# Patient Record
Sex: FEMALE | Race: WHITE | NOT HISPANIC OR LATINO | URBAN - METROPOLITAN AREA
[De-identification: names, ages, dates, MRNs, and addresses within clinical notes are randomized per-mention and may not be internally consistent; named-entity substitution may affect disease eponyms.]

---

## 2022-06-04 ENCOUNTER — INPATIENT (INPATIENT)
Facility: HOSPITAL | Age: 52
LOS: 3 days | Discharge: HOME | DRG: 884 | End: 2022-06-08
Attending: HOSPITALIST | Admitting: STUDENT IN AN ORGANIZED HEALTH CARE EDUCATION/TRAINING PROGRAM
Payer: COMMERCIAL

## 2022-06-04 VITALS
SYSTOLIC BLOOD PRESSURE: 127 MMHG | RESPIRATION RATE: 18 BRPM | WEIGHT: 205.03 LBS | OXYGEN SATURATION: 98 % | HEART RATE: 81 BPM | HEIGHT: 68 IN | TEMPERATURE: 99 F | DIASTOLIC BLOOD PRESSURE: 71 MMHG

## 2022-06-04 DIAGNOSIS — T50.902A POISONING BY UNSPECIFIED DRUGS, MEDICAMENTS AND BIOLOGICAL SUBSTANCES, INTENTIONAL SELF-HARM, INITIAL ENCOUNTER: ICD-10-CM

## 2022-06-04 DIAGNOSIS — I21.4 NON-ST ELEVATION (NSTEMI) MYOCARDIAL INFARCTION: ICD-10-CM

## 2022-06-04 DIAGNOSIS — R77.8 OTHER SPECIFIED ABNORMALITIES OF PLASMA PROTEINS: ICD-10-CM

## 2022-06-04 DIAGNOSIS — R79.89 OTHER SPECIFIED ABNORMAL FINDINGS OF BLOOD CHEMISTRY: ICD-10-CM

## 2022-06-04 DIAGNOSIS — N17.9 ACUTE KIDNEY FAILURE, UNSPECIFIED: ICD-10-CM

## 2022-06-04 LAB
ALBUMIN SERPL ELPH-MCNC: 4.6 G/DL — SIGNIFICANT CHANGE UP (ref 3.3–5.2)
ALP SERPL-CCNC: 242 U/L — HIGH (ref 40–120)
ALT FLD-CCNC: 898 U/L — HIGH
ANION GAP SERPL CALC-SCNC: 14 MMOL/L — SIGNIFICANT CHANGE UP (ref 5–17)
ANION GAP SERPL CALC-SCNC: 18 MMOL/L — HIGH (ref 5–17)
APPEARANCE UR: CLEAR — SIGNIFICANT CHANGE UP
AST SERPL-CCNC: 682 U/L — HIGH
BACTERIA # UR AUTO: ABNORMAL
BASOPHILS # BLD AUTO: 0.01 K/UL — SIGNIFICANT CHANGE UP (ref 0–0.2)
BASOPHILS NFR BLD AUTO: 0.1 % — SIGNIFICANT CHANGE UP (ref 0–2)
BILIRUB SERPL-MCNC: 0.9 MG/DL — SIGNIFICANT CHANGE UP (ref 0.4–2)
BILIRUB UR-MCNC: NEGATIVE — SIGNIFICANT CHANGE UP
BUN SERPL-MCNC: 27.5 MG/DL — HIGH (ref 8–20)
BUN SERPL-MCNC: 31 MG/DL — HIGH (ref 8–20)
CALCIUM SERPL-MCNC: 8.4 MG/DL — LOW (ref 8.6–10.2)
CALCIUM SERPL-MCNC: 9.1 MG/DL — SIGNIFICANT CHANGE UP (ref 8.6–10.2)
CHLORIDE SERPL-SCNC: 89 MMOL/L — LOW (ref 98–107)
CHLORIDE SERPL-SCNC: 91 MMOL/L — LOW (ref 98–107)
CK MB CFR SERPL CALC: 8.4 NG/ML — HIGH (ref 0–6.7)
CK SERPL-CCNC: 375 U/L — HIGH (ref 25–170)
CO2 SERPL-SCNC: 22 MMOL/L — SIGNIFICANT CHANGE UP (ref 22–29)
CO2 SERPL-SCNC: 25 MMOL/L — SIGNIFICANT CHANGE UP (ref 22–29)
COLOR SPEC: YELLOW — SIGNIFICANT CHANGE UP
CREAT SERPL-MCNC: 1.64 MG/DL — HIGH (ref 0.5–1.3)
CREAT SERPL-MCNC: 2.18 MG/DL — HIGH (ref 0.5–1.3)
CRP SERPL-MCNC: 62 MG/L — HIGH
D DIMER BLD IA.RAPID-MCNC: 352 NG/ML DDU — HIGH
DIFF PNL FLD: ABNORMAL
EGFR: 26 ML/MIN/1.73M2 — LOW
EGFR: 37 ML/MIN/1.73M2 — LOW
EOSINOPHIL # BLD AUTO: 0 K/UL — SIGNIFICANT CHANGE UP (ref 0–0.5)
EOSINOPHIL NFR BLD AUTO: 0 % — SIGNIFICANT CHANGE UP (ref 0–6)
EPI CELLS # UR: SIGNIFICANT CHANGE UP
ERYTHROCYTE [SEDIMENTATION RATE] IN BLOOD: 18 MM/HR — SIGNIFICANT CHANGE UP (ref 0–20)
GLUCOSE SERPL-MCNC: 136 MG/DL — HIGH (ref 70–99)
GLUCOSE SERPL-MCNC: 140 MG/DL — HIGH (ref 70–99)
GLUCOSE UR QL: NEGATIVE MG/DL — SIGNIFICANT CHANGE UP
HCT VFR BLD CALC: 35.5 % — SIGNIFICANT CHANGE UP (ref 34.5–45)
HGB BLD-MCNC: 10.8 G/DL — LOW (ref 11.5–15.5)
IMM GRANULOCYTES NFR BLD AUTO: 0.7 % — SIGNIFICANT CHANGE UP (ref 0–1.5)
INR BLD: 1.1 RATIO — SIGNIFICANT CHANGE UP (ref 0.88–1.16)
KETONES UR-MCNC: ABNORMAL
LEUKOCYTE ESTERASE UR-ACNC: NEGATIVE — SIGNIFICANT CHANGE UP
LYMPHOCYTES # BLD AUTO: 0.37 K/UL — LOW (ref 1–3.3)
LYMPHOCYTES # BLD AUTO: 5.2 % — LOW (ref 13–44)
MCHC RBC-ENTMCNC: 24.8 PG — LOW (ref 27–34)
MCHC RBC-ENTMCNC: 30.4 GM/DL — LOW (ref 32–36)
MCV RBC AUTO: 81.4 FL — SIGNIFICANT CHANGE UP (ref 80–100)
MONOCYTES # BLD AUTO: 0.63 K/UL — SIGNIFICANT CHANGE UP (ref 0–0.9)
MONOCYTES NFR BLD AUTO: 8.9 % — SIGNIFICANT CHANGE UP (ref 2–14)
NEUTROPHILS # BLD AUTO: 5.99 K/UL — SIGNIFICANT CHANGE UP (ref 1.8–7.4)
NEUTROPHILS NFR BLD AUTO: 85.1 % — HIGH (ref 43–77)
NITRITE UR-MCNC: NEGATIVE — SIGNIFICANT CHANGE UP
NT-PROBNP SERPL-SCNC: 2396 PG/ML — HIGH (ref 0–300)
PH UR: 5 — SIGNIFICANT CHANGE UP (ref 5–8)
PLATELET # BLD AUTO: 230 K/UL — SIGNIFICANT CHANGE UP (ref 150–400)
POTASSIUM SERPL-MCNC: 4 MMOL/L — SIGNIFICANT CHANGE UP (ref 3.5–5.3)
POTASSIUM SERPL-MCNC: 6.2 MMOL/L — CRITICAL HIGH (ref 3.5–5.3)
POTASSIUM SERPL-SCNC: 4 MMOL/L — SIGNIFICANT CHANGE UP (ref 3.5–5.3)
POTASSIUM SERPL-SCNC: 6.2 MMOL/L — CRITICAL HIGH (ref 3.5–5.3)
PROT SERPL-MCNC: 7.5 G/DL — SIGNIFICANT CHANGE UP (ref 6.6–8.7)
PROT UR-MCNC: NEGATIVE — SIGNIFICANT CHANGE UP
PROTHROM AB SERPL-ACNC: 12.8 SEC — SIGNIFICANT CHANGE UP (ref 10.5–13.4)
RBC # BLD: 4.36 M/UL — SIGNIFICANT CHANGE UP (ref 3.8–5.2)
RBC # FLD: 17 % — HIGH (ref 10.3–14.5)
RBC CASTS # UR COMP ASSIST: SIGNIFICANT CHANGE UP /HPF (ref 0–4)
SARS-COV-2 RNA SPEC QL NAA+PROBE: SIGNIFICANT CHANGE UP
SODIUM SERPL-SCNC: 129 MMOL/L — LOW (ref 135–145)
SODIUM SERPL-SCNC: 130 MMOL/L — LOW (ref 135–145)
SP GR SPEC: 1.02 — SIGNIFICANT CHANGE UP (ref 1.01–1.02)
TROPONIN T SERPL-MCNC: 0.09 NG/ML — HIGH (ref 0–0.06)
TROPONIN T SERPL-MCNC: 0.13 NG/ML — HIGH (ref 0–0.06)
TROPONIN T SERPL-MCNC: 0.19 NG/ML — HIGH (ref 0–0.06)
TSH SERPL-MCNC: 0.41 UIU/ML — SIGNIFICANT CHANGE UP (ref 0.27–4.2)
UROBILINOGEN FLD QL: 1 MG/DL
WBC # BLD: 7.05 K/UL — SIGNIFICANT CHANGE UP (ref 3.8–10.5)
WBC # FLD AUTO: 7.05 K/UL — SIGNIFICANT CHANGE UP (ref 3.8–10.5)
WBC UR QL: SIGNIFICANT CHANGE UP /HPF (ref 0–5)

## 2022-06-04 PROCEDURE — 99223 1ST HOSP IP/OBS HIGH 75: CPT

## 2022-06-04 PROCEDURE — 71045 X-RAY EXAM CHEST 1 VIEW: CPT | Mod: 26

## 2022-06-04 PROCEDURE — 99255 IP/OBS CONSLTJ NEW/EST HI 80: CPT

## 2022-06-04 PROCEDURE — 93010 ELECTROCARDIOGRAM REPORT: CPT

## 2022-06-04 PROCEDURE — 99291 CRITICAL CARE FIRST HOUR: CPT

## 2022-06-04 RX ORDER — DEXTROSE 10 % IN WATER 10 %
250 INTRAVENOUS SOLUTION INTRAVENOUS ONCE
Refills: 0 | Status: COMPLETED | OUTPATIENT
Start: 2022-06-04 | End: 2022-06-04

## 2022-06-04 RX ORDER — VENLAFAXINE HCL 75 MG
150 CAPSULE, EXT RELEASE 24 HR ORAL DAILY
Refills: 0 | Status: DISCONTINUED | OUTPATIENT
Start: 2022-06-04 | End: 2022-06-04

## 2022-06-04 RX ORDER — GABAPENTIN 400 MG/1
400 CAPSULE ORAL
Refills: 0 | Status: DISCONTINUED | OUTPATIENT
Start: 2022-06-04 | End: 2022-06-08

## 2022-06-04 RX ORDER — FUROSEMIDE 40 MG
20 TABLET ORAL ONCE
Refills: 0 | Status: DISCONTINUED | OUTPATIENT
Start: 2022-06-04 | End: 2022-06-04

## 2022-06-04 RX ORDER — DEXTROSE 50 % IN WATER 50 %
50 SYRINGE (ML) INTRAVENOUS ONCE
Refills: 0 | Status: DISCONTINUED | OUTPATIENT
Start: 2022-06-04 | End: 2022-06-04

## 2022-06-04 RX ORDER — HEPARIN SODIUM 5000 [USP'U]/ML
5000 INJECTION INTRAVENOUS; SUBCUTANEOUS EVERY 12 HOURS
Refills: 0 | Status: DISCONTINUED | OUTPATIENT
Start: 2022-06-04 | End: 2022-06-08

## 2022-06-04 RX ORDER — SODIUM CHLORIDE 9 MG/ML
1000 INJECTION INTRAMUSCULAR; INTRAVENOUS; SUBCUTANEOUS ONCE
Refills: 0 | Status: COMPLETED | OUTPATIENT
Start: 2022-06-04 | End: 2022-06-04

## 2022-06-04 RX ORDER — BUPRENORPHINE AND NALOXONE 2; .5 MG/1; MG/1
1 TABLET SUBLINGUAL
Qty: 0 | Refills: 0 | DISCHARGE

## 2022-06-04 RX ORDER — LORATADINE 10 MG/1
10 TABLET ORAL DAILY
Refills: 0 | Status: DISCONTINUED | OUTPATIENT
Start: 2022-06-04 | End: 2022-06-08

## 2022-06-04 RX ORDER — FOLIC ACID 0.8 MG
1 TABLET ORAL
Qty: 0 | Refills: 0 | DISCHARGE

## 2022-06-04 RX ORDER — CALCIUM GLUCONATE 100 MG/ML
1 VIAL (ML) INTRAVENOUS
Refills: 0 | Status: COMPLETED | OUTPATIENT
Start: 2022-06-04 | End: 2022-06-04

## 2022-06-04 RX ORDER — SODIUM CHLORIDE 9 MG/ML
1000 INJECTION INTRAMUSCULAR; INTRAVENOUS; SUBCUTANEOUS
Refills: 0 | Status: DISCONTINUED | OUTPATIENT
Start: 2022-06-04 | End: 2022-06-07

## 2022-06-04 RX ORDER — AMLODIPINE BESYLATE 2.5 MG/1
1 TABLET ORAL
Qty: 0 | Refills: 0 | DISCHARGE

## 2022-06-04 RX ORDER — INSULIN HUMAN 100 [IU]/ML
6 INJECTION, SOLUTION SUBCUTANEOUS ONCE
Refills: 0 | Status: COMPLETED | OUTPATIENT
Start: 2022-06-04 | End: 2022-06-04

## 2022-06-04 RX ORDER — CYCLOBENZAPRINE HYDROCHLORIDE 10 MG/1
5 TABLET, FILM COATED ORAL EVERY 12 HOURS
Refills: 0 | Status: DISCONTINUED | OUTPATIENT
Start: 2022-06-04 | End: 2022-06-08

## 2022-06-04 RX ORDER — INSULIN HUMAN 100 [IU]/ML
10 INJECTION, SOLUTION SUBCUTANEOUS ONCE
Refills: 0 | Status: DISCONTINUED | OUTPATIENT
Start: 2022-06-04 | End: 2022-06-04

## 2022-06-04 RX ORDER — SODIUM ZIRCONIUM CYCLOSILICATE 10 G/10G
10 POWDER, FOR SUSPENSION ORAL ONCE
Refills: 0 | Status: COMPLETED | OUTPATIENT
Start: 2022-06-04 | End: 2022-06-04

## 2022-06-04 RX ADMIN — GABAPENTIN 400 MILLIGRAM(S): 400 CAPSULE ORAL at 17:12

## 2022-06-04 RX ADMIN — Medication 100 GRAM(S): at 18:59

## 2022-06-04 RX ADMIN — INSULIN HUMAN 6 UNIT(S): 100 INJECTION, SOLUTION SUBCUTANEOUS at 13:18

## 2022-06-04 RX ADMIN — Medication 1000 MILLILITER(S): at 12:45

## 2022-06-04 RX ADMIN — SODIUM ZIRCONIUM CYCLOSILICATE 10 GRAM(S): 10 POWDER, FOR SUSPENSION ORAL at 13:19

## 2022-06-04 RX ADMIN — HEPARIN SODIUM 5000 UNIT(S): 5000 INJECTION INTRAVENOUS; SUBCUTANEOUS at 17:13

## 2022-06-04 RX ADMIN — SODIUM CHLORIDE 1000 MILLILITER(S): 9 INJECTION INTRAMUSCULAR; INTRAVENOUS; SUBCUTANEOUS at 12:45

## 2022-06-04 RX ADMIN — Medication 100 GRAM(S): at 17:20

## 2022-06-04 NOTE — ED ADULT NURSE NOTE - CHIEF COMPLAINT QUOTE
Pt BIBA from [facility], as per staff, pt found unresponsive in bed this morning, cyanotic and hypoxic, given 1mg narcan by staff, pt started on suboxone yesterday, denies additional drug use, pt states "I just feel really sleepy", AOx3 upon ED arrival, [facility] aide at bedside

## 2022-06-04 NOTE — CONSULT NOTE ADULT - SUBJECTIVE AND OBJECTIVE BOX
Patient is a 52y old  Female who presents with a chief complaint of     HPI:    · Chief Complaint: The patient is a 52y Female complaining of unresponsive.    · HPI Objective Statement: Pt is a 53 yo F BIB EMS from [facility] for unresponsive episode.  Pt is at [facility] after a suicide attempt in which she drank a bottle of vodka and took muscle relaxants in an attempt to kill herself. Pt went to [facility] and was sent to [facility] from there.  Pt was started on Suboxone yesterday and today they found her unresponsive in her bed. Pt was hypoxic and EMS was called and patient was administered 1 mg of Narcan and woke up.  Pt with no complaints currently.      PAST MEDICAL & SURGICAL HISTORY: Hypertension,     FAMILY HISTORY: No ESRD,    Social History: Alcoholism,     MEDICATIONS  (STANDING):    calcium gluconate IVPB 1 Gram(s) IV Intermittent every 1 hour  sodium chloride 0.9%. 1000 milliLiter(s) (100 mL/Hr) IV Continuous <Continuous>    MEDICATIONS  (PRN):    Allergies    No Known Allergies    Intolerances    REVIEW OF SYSTEMS:    CONSTITUTIONAL: No fever, weight loss, + fatigue  EYES: No eye pain, visual disturbances, or discharge  ENMT:  No difficulty hearing, tinnitus, vertigo; No sinus or throat pain  NECK: No pain or stiffness  BREASTS: No pain, masses, or nipple discharge  RESPIRATORY: No cough, wheezing, chills or hemoptysis; No shortness of breath  CARDIOVASCULAR: No chest pain, palpitations, dizziness, or leg swelling  GASTROINTESTINAL: No abdominal or epigastric pain. No nausea, vomiting, or hematemesis; No diarrhea or constipation. No melena or hematochezia.  GENITOURINARY: No dysuria, frequency, hematuria, or incontinence  NEUROLOGICAL: No headaches, memory loss, loss of strength, numbness, + tremors  SKIN: No itching, burning, rashes, or lesions   LYMPH NODES: No enlarged glands  ENDOCRINE: No heat or cold intolerance; No hair loss  MUSCULOSKELETAL: No joint pain or swelling; ++ muscle, back,  No extremity pain  PSYCHIATRIC: +++ depression, anxiety, mood swings,  difficulty sleeping  HEME/LYMPH: No easy bruising, or bleeding gums  ALLERGY AND IMMUNOLOGIC: No hives or eczema    Vital Signs Last 24 Hrs  T(C): 37.1 (04 Jun 2022 11:08), Max: 37.1 (04 Jun 2022 11:08)  T(F): 98.7 (04 Jun 2022 11:08), Max: 98.7 (04 Jun 2022 11:08)  HR: 81 (04 Jun 2022 11:08) (81 - 81)  BP: 127/71 (04 Jun 2022 11:08) (127/71 - 127/71)  RR: 18 (04 Jun 2022 11:08) (18 - 18)  SpO2: 98% (04 Jun 2022 11:08) (98% - 98%)    PHYSICAL EXAM:    GENERAL: NAD, well-groomed, well-developed , Alert, {Pale }  HEAD:  Atraumatic, Normocephalic  EYES: EOMI, PERRLA, conjunctiva and sclera clear  ENMT: Dry mucous membranes, No lesions  NECK: Supple, No JVD,   NERVOUS SYSTEM:  Alert & Oriented X3, Good concentration;   CHEST/LUNG: Clear to percussion bilaterally; No rales, rhonchi, wheezing, or rubs  HEART: Regular rate and rhythm; No murmurs, rubs, or gallops  ABDOMEN: Soft, Nontender, Nondistended; Bowel sounds present  EXTREMITIES:  2+ Peripheral Pulses, No clubbing, cyanosis, or edema  LYMPH: No lymphadenopathy noted  SKIN: No rashes or lesions    LABS:                        10.8   7.05  )-----------( 230      ( 04 Jun 2022 11:38 )             35.5     06-04    129<L>  |  89<L>  |  27.5<H>  ----------------------------<  136<H>  6.2<HH>   |  22.0  |  2.18<H>    Ca    8.4<L>      04 Jun 2022 11:38    TPro  7.5  /  Alb  4.6  /  TBili  0.9  /  DBili  x   /  AST  682<H>  /  ALT  898<H>  /  AlkPhos  242<H>  06-04    RADIOLOGY & ADDITIONAL TESTS:  eGFR: 26: The estimated glomerular filtration rate (eGFR) is calculated using the   2021 CKD-EPI creatinine equation, which does not have a coefficient for   race and is validated in individuals 18 years of age and older (N Engl J   Med 2021; 385:0458-2704). Creatinine-based eGFR may be inaccurate in   various situations including but not limited to extremes of muscle mass,   altered dietary protein intake, or medications that affect renal tubular   creatinine secretion. mL/min/1.73m2 (06.04.22 @ 11:38)   
                                             [facility]                                              CARDIOLOGY AT [place] [address]                                             Telephone: [phone]. Fax:[phone]                                                         CARDIOLOGY CONSULTATION NOTE                                                                                             Consult requested by:  ewelina Kilpatrick  History obtained by: Patient and medical record  Community Cardiologist: 3 [facility]   obtained: Yes [  ] No [ x ]  Reason for Consultation: Elevated trop  Available out pt records reviewed: Yes [  ] No [ x ]    Chief complaint:    Patient is a 52y old  Female  with PMH of alcoholism, depression, HTN, Chronic back pain who presents to the ER from [facility] after being found hypoxic and non responsive.  She was doing well in [facility] and states she did not take anything other then meds prescribed.  Was started on buprenorphine/naloxone yesterday which made her sleepy.  She was in [facility] after a suicide attempt.  She states she no longer wants to die  Arrived in er and blood work c/w ARF. hyponatrmeia with transaminitis and with elevated top  We were asked to see her for positive trop  on exam she has a positive rub    PMH  HTN    PSH  Gastric bypass surgery  Lumbar surgery  Choleycystectomy    SOCIAL HISTORY:    Former smoker  Etoh at least one liter vodka per day  Uses marijuana    Family History of Cardiovascular Disease:  Yes [ x ] No [  ]  Coronary Artery Disease in first degree relative: Yes [  ] No [ x ]  Sudden Cardiac Death in First degree relative: Yes [  ] No x[  ]    HOME MEDICATIONS: Cardiac  Norvasc 10mg qd  Hctz 25 mg qd      CURRENT MEDICATIONS:  loratadine  gabapentin  calcium gluconate IVPB  heparin   Injectable  sodium chloride 0.9%.    ALLERGIES: NKDA    REVIEW OF SYMPTOMS:   CONSTITUTIONAL: No fever, no chills, no weight loss, no weight gain, no fatigue   ENMT:  No vertigo; No sinus or throat pain  NECK: No pain or stiffness  CARDIOVASCULAR: No chest pain, no dyspnea, no syncope/presyncope, no palpitations, no dizziness, no Orthopnea, no Paroxsymal nocturnal dyspnea  RESPIRATORY: no Shortness of breath, no cough, no wheezing  : No dysuria, no hematuria   GI: No dark color stool, no nausea, no diarrhea, no constipation, no abdominal pain   NEURO: No headache, no slurred speech   MUSCULOSKELETAL: No joint pain or swelling; No muscle, back, or extremity pain  PSYCH: No agitation, no anxiety.    ALL OTHER REVIEW OF SYSTEMS ARE NEGATIVE.      Vital Signs Last 24 Hrs  T(C): 37.1 (04 Jun 2022 11:08), Max: 37.1 (04 Jun 2022 11:08)  T(F): 98.7 (04 Jun 2022 11:08), Max: 98.7 (04 Jun 2022 11:08)  HR: 81 (04 Jun 2022 11:08) (81 - 81)  BP: 127/71 (04 Jun 2022 11:08) (127/71 - 127/71)  BP(mean): --  RR: 18 (04 Jun 2022 11:08) (18 - 18)  SpO2: 98% (04 Jun 2022 11:08) (98% - 98%)  INTAKE AND OUTPUT:     PHYSICAL EXAM:  Constitutional: Comfortable . No acute distress.   HEENT: Atraumatic and normocephalic , neck is supple . no JVD. No carotid bruit.  CNS: A&Ox3. No focal deficits.   Respiratory: CTAB, unlabored   Cardiovascular:s1&s2 regular 2/6 danae lsb ++ rub  Gastrointestinal: Soft, non-tender. +Bowel sounds.   MSK: full ROM extremities x 4  Extremities: No edema. No cyanosis   Psychiatric: Calm . no agitation.   Skin: Warm and dry, no ulcers on extremities       LABS:                        10.8   7.05  )-----------( 230      ( 04 Jun 2022 11:38 )             35.5     06-04    129<L>  |  89<L>  |  27.5<H>  ----------------------------<  136<H>  6.2<HH>   |  22.0  |  2.18<H>    Ca    8.4<L>      04 Jun 2022 11:38    TPro  7.5  /  Alb  4.6  /  TBili  0.9  /  DBili  x   /  AST  682<H>  /  ALT  898<H>  /  AlkPhos  242<H>  06-04    CARDIAC MARKERS ( 04 Jun 2022 11:38 )  x     / 0.19 ng/mL / x     / x     / x        ;p-BNP=    INTERPRETATION OF TELEMETRY:     ECG: NSR wnl  Prior ECG: Yes [  ] No [ x ]

## 2022-06-04 NOTE — CONSULT NOTE ADULT - ASSESSMENT
PETER -Pre Renal,  ? Baseline     Hyperkalemia,     Hypovolemia,     labs and imaging reviewed.  EKG NSR with no ST-T changes.  D-dimer minimally elevated unable to do CTA as patient has poor kidney function currently also unlikely PE.  trop elevated as well as hyperkalemia in setting of PETER.    Patient treated for hyperkalemia with insulin, dextrose, IVF, lokelma.      Case d/w Dr. Moises Steinberg for admission.    Labs, EKG, Imaging Studies, Medications Reviewed,      Rec :     Avoidance of nephrotoxins/nephrotoxin medication adjustment  Medication dosage adjustment for kidney function  Continuous IVF administration ,  Close monitoring of serum Creatinine and UO  Acid-base, electrolyte and albumin status management  Consider alternatives to radiocontrast administration  Optimizing hemodynamics:    W.U ,  D/W Dr. Steinberg, 
52y old  Female  with PMH of alcoholism, depression, HTN, Chronic back pain who presents to the ER from [facility] after being found hypoxic and non responsive.  She was doing well in [facility] and states she did not take anything other then meds prescribed.  Was started on buprenorphine/naloxone yesterday which made her sleepy.  She was in [facility] after a suicide attempt.  She states she no longer wants to die  Arrived in er and blood work c/w ARF. hyponatremia with transaminitis and with elevated top  We were asked to see her for positive trop  on exam she has a positive rub

## 2022-06-04 NOTE — CONSULT NOTE ADULT - PROBLEM SELECTOR RECOMMENDATION 9
in the setting of hypoxemia and renal failure  would trend trop  Likely does not represent cardiac ischemic but would repeat ekg in am and trend trop  + cardiac rub on exam would get crp esr and Echo   Further workup dependent on above  will consider ischemic work up this admit

## 2022-06-04 NOTE — CONSULT NOTE ADULT - PROBLEM SELECTOR RECOMMENDATION 3
Patient notified   unclear what patient took  She denies this as well as any suicide idealization  Behavioral health consult

## 2022-06-04 NOTE — ED PROVIDER NOTE - CLINICAL SUMMARY MEDICAL DECISION MAKING FREE TEXT BOX
labs and imaging reviewed.  EKG NSR with no ST-T changes.  dimer minimally elevated unable to do CTA as patient has poor kidney function currently also unlikely PE.  trop elevated as well as hyperkalemia in setting of PETER.  renal and cards consulted.  Pt treated for hyperkalemia with insulin, dextrose, IVF, lokelma.  Case d/w Dr. Moises Steinberg for admission.

## 2022-06-04 NOTE — H&P ADULT - HISTORY OF PRESENT ILLNESS
52F presented from [facility] after being found to be unresponsive. 52F presented from [facility] after being found to be unresponsive and hypoxic. Naloxone was administered and the patient was transferred to the hospital for evaluation. The patient was unable to remember the events. She noted that she was in her usual state of health the day prior and was started on buprenorphine/naloxone yesterday. She did state that the medication had made her feel somewhat sleepy but was able to carry on throughout the day. She denied episodes of dyspnea, chest pain, or palpitations. She reported that she had been admitted to [facility] after an attempted suicide by overdose with alcohol and muscle relaxers. Upon arrival to the emergency department, the patient was more awake and alert and without dyspnea. Initial laboratory studies were notable for acute kidney injury with hyperkalemia as well as transaminitis and elevated troponin level.

## 2022-06-04 NOTE — ED ADULT NURSE NOTE - OBJECTIVE STATEMENT
patient came in for unresponsiveness from long term facility, was given 1 mg narcan and patient woke up per ems. patient still a little drowsy but oriented and awake.

## 2022-06-04 NOTE — ED ADULT TRIAGE NOTE - CHIEF COMPLAINT QUOTE
Pt BIBA from [facility], as per staff, pt found unresponsive in bed this morning, cyanotic and hypoxic, given 1mg narcan by staff, pt started on suboxone yesterday, denies additional drug use, pt states "I just feel really sleepy", AOx3 upon ED arrival, [facility] aide at bedside

## 2022-06-04 NOTE — H&P ADULT - NSHPPHYSICALEXAM_GEN_ALL_CORE
Vital Signs Last 24 Hrs  T(C): 37.1 (04 Jun 2022 11:08), Max: 37.1 (04 Jun 2022 11:08)  T(F): 98.7 (04 Jun 2022 11:08), Max: 98.7 (04 Jun 2022 11:08)  HR: 81 (04 Jun 2022 11:08) (81 - 81)  BP: 127/71 (04 Jun 2022 11:08) (127/71 - 127/71)  BP(mean): --  RR: 18 (04 Jun 2022 11:08) (18 - 18)  SpO2: 98% (04 Jun 2022 11:08) (98% - 98%)    General appearance: No acute distress, Awake, Alert  HEENT: Normocephalic, Atraumatic, Conjunctiva clear, EOMI  Neck: Supple, No JVD, No tenderness  Lungs: Breath sound equal bilaterally, No wheezes, No rales  Cardiovascular: S1S2, Regular rhythm  Abdomen: Soft, Nontender, Nondistended, No guarding/rebound, Positive bowel sounds  Extremities: No clubbing, No cyanosis, No edema, No calf tenderness  Neuro: Strength equal bilaterally, No tremors  Psychiatric: Appropriate mood, Normal affect

## 2022-06-04 NOTE — ED PROVIDER NOTE - OBJECTIVE STATEMENT
Pt is a 53 yo F BIBEMS from [facility] for unresponsive episode.  Pt is at [facility] after a suicide attempt in which she drank a bottle of vodka and took muscle relaxants in an attempt to kill herself. Pt went to [facility] and was sent to [facility] from there.  Pt was started on suboxone yesterday and today they found her unresponsive in her bed. Pt was hypoxic and EMS was called and patient was administered 1 mg of narcan and woke up.  Pt with no complaints currently.

## 2022-06-04 NOTE — ED PROVIDER NOTE - PHYSICAL EXAMINATION
Constitutional - well-developed.   Head - NCAT. Airway patent.   Eyes - PERRL.   CV - RRR. no murmur. no edema.   Pulm - CTAB.   Abd - soft, nt. no rebound. no guarding.   Neuro - A&Ox3. strength 5/5 x4. sensation intact x4. normal gait.   Skin - No rash. .  MSK - normal ROM.

## 2022-06-04 NOTE — H&P ADULT - ASSESSMENT
52F who was previously admitted to [facility] after a suicide attempt who was recently started on buprenorphine/naloxone who was found to be unresponsive and hypoxic for which naloxone was administered. Laboratory studies later noted acute kidney injury with hyperkalemia, elevated troponin level, and transaminitis.    Unresponsiveness / Hypoxia - Suspect secondary to medications (buprenorphine/naloxone, venlafaxine). Improved with naloxone administration. No neurological deficits noted on examination. D-dimer noted to be slightly elevated. Pending ventilation/perfusion scan. To titrated supplemental oxygen as tolerated.    Acute kidney injury / Hyperkalemia / Hyponatremia - Hydrochlorothiazide to be held. Lokelma administered for hyperkalemia. EKG was without acute changes. Repeat potassium level to be reviewed when available. Discussed with Nephrology. For continuation of intravenous fluids. Renal ultrasound pending.    Elevated troponin level - Noted in the setting of hypoxia and acute kidney injury. EKG was without acute findings. Monitor on telemetry. Repeat troponin level pending. Cardiology consultation pending.    Suicide attempt - On venlafaxine. Constant bedside observation.    Neuropathy - On gabapentin. Dose adjustment pending renal function.    Hypertension - Close blood pressure monitoring. On amlodipine. 52F who was previously admitted to [facility] after a suicide attempt who was recently started on buprenorphine/naloxone who was found to be unresponsive and hypoxic for which naloxone was administered. Laboratory studies later noted acute kidney injury with hyperkalemia, elevated troponin level, and transaminitis.    Unresponsiveness / Hypoxia - Suspect secondary to medications (buprenorphine/naloxone, venlafaxine). Improved with naloxone administration. No neurological deficits noted on examination. D-dimer noted to be slightly elevated. Pending ventilation/perfusion scan. To titrated supplemental oxygen as tolerated.    Acute kidney injury / Hyperkalemia / Hyponatremia - Hydrochlorothiazide to be held. Lokelma administered for hyperkalemia. EKG was without acute changes. Repeat potassium level to be reviewed when available. Discussed with Nephrology. For continuation of intravenous fluids. Renal ultrasound pending.    Transaminitis - Venlafaxine to be held for now. Comprehensive metabolic panel to be repeated. No prior studies available for comparison. No abdominal pain or elevated bilirubin level.    Elevated troponin level - Noted in the setting of hypoxia and acute kidney injury. EKG was without acute findings. Monitor on telemetry. Repeat troponin level pending. Cardiology consultation pending.    Suicide attempt - On venlafaxine. Constant bedside observation.    Neuropathy - On gabapentin. Dose adjustment pending renal function.    Hypertension - Close blood pressure monitoring. On amlodipine.

## 2022-06-04 NOTE — H&P ADULT - NSHPSOCIALHISTORY_GEN_ALL_CORE
Prior alcohol and opiate medication abuse    Residing at [facility] for detox program    PMH: Hypertension, ADHD, Neuropathy  PSH: Lumbar spine surgery  Family History: Parents were without premature coronary artery disease

## 2022-06-04 NOTE — ED PROVIDER NOTE - CARE PLAN
Principal Discharge DX:	NSTEMI (non-ST elevation myocardial infarction)  Secondary Diagnosis:	PETER (acute kidney injury)  Secondary Diagnosis:	Hyperkalemia   1

## 2022-06-04 NOTE — CONSULT NOTE ADULT - NS ATTEND AMEND GEN_ALL_CORE FT
Elevated trops: NO cardiac symptoms. HAs renal failrue and transmainitis. Recommend IV fluids.   On exam: Pericardia friction rub. Send ESR and CRp. Transthoracic echocardiogram toe valuate for pericarial effusion.  no ischemic work up at this time. Patienty has multiorden dysfunction with no specific diagnosis at this time  ON suboxanoe and other meds for her addiction  opatient need to be evaluated further before doing ischemic evaln.   aspirin statins.  repeat trops. before heparin drip,. no ischemic ECG changes.

## 2022-06-05 LAB
ALBUMIN SERPL ELPH-MCNC: 4 G/DL — SIGNIFICANT CHANGE UP (ref 3.3–5.2)
ALP SERPL-CCNC: 198 U/L — HIGH (ref 40–120)
ALT FLD-CCNC: 1297 U/L — HIGH
ANION GAP SERPL CALC-SCNC: 14 MMOL/L — SIGNIFICANT CHANGE UP (ref 5–17)
AST SERPL-CCNC: 1189 U/L — HIGH
BILIRUB SERPL-MCNC: 0.6 MG/DL — SIGNIFICANT CHANGE UP (ref 0.4–2)
BUN SERPL-MCNC: 30.5 MG/DL — HIGH (ref 8–20)
CALCIUM SERPL-MCNC: 9 MG/DL — SIGNIFICANT CHANGE UP (ref 8.6–10.2)
CHLORIDE SERPL-SCNC: 94 MMOL/L — LOW (ref 98–107)
CO2 SERPL-SCNC: 27 MMOL/L — SIGNIFICANT CHANGE UP (ref 22–29)
CREAT SERPL-MCNC: 1.36 MG/DL — HIGH (ref 0.5–1.3)
EGFR: 46 ML/MIN/1.73M2 — LOW
GLUCOSE SERPL-MCNC: 117 MG/DL — HIGH (ref 70–99)
HCT VFR BLD CALC: 29.2 % — LOW (ref 34.5–45)
HGB BLD-MCNC: 9.5 G/DL — LOW (ref 11.5–15.5)
MCHC RBC-ENTMCNC: 25.7 PG — LOW (ref 27–34)
MCHC RBC-ENTMCNC: 32.5 GM/DL — SIGNIFICANT CHANGE UP (ref 32–36)
MCV RBC AUTO: 79.1 FL — LOW (ref 80–100)
OSMOLALITY UR: 382 MOSM/KG — SIGNIFICANT CHANGE UP (ref 300–1000)
PLATELET # BLD AUTO: 201 K/UL — SIGNIFICANT CHANGE UP (ref 150–400)
POTASSIUM SERPL-MCNC: 3.9 MMOL/L — SIGNIFICANT CHANGE UP (ref 3.5–5.3)
POTASSIUM SERPL-SCNC: 3.9 MMOL/L — SIGNIFICANT CHANGE UP (ref 3.5–5.3)
POTASSIUM UR-SCNC: 11 MMOL/L — SIGNIFICANT CHANGE UP
PROT SERPL-MCNC: 6.6 G/DL — SIGNIFICANT CHANGE UP (ref 6.6–8.7)
RBC # BLD: 3.69 M/UL — LOW (ref 3.8–5.2)
RBC # FLD: 17.2 % — HIGH (ref 10.3–14.5)
SODIUM SERPL-SCNC: 134 MMOL/L — LOW (ref 135–145)
SODIUM UR-SCNC: 60 MMOL/L — SIGNIFICANT CHANGE UP
TSH SERPL-MCNC: 0.49 UIU/ML — SIGNIFICANT CHANGE UP (ref 0.27–4.2)
WBC # BLD: 4.66 K/UL — SIGNIFICANT CHANGE UP (ref 3.8–10.5)
WBC # FLD AUTO: 4.66 K/UL — SIGNIFICANT CHANGE UP (ref 3.8–10.5)

## 2022-06-05 PROCEDURE — 93010 ELECTROCARDIOGRAM REPORT: CPT

## 2022-06-05 PROCEDURE — 99233 SBSQ HOSP IP/OBS HIGH 50: CPT

## 2022-06-05 PROCEDURE — 99232 SBSQ HOSP IP/OBS MODERATE 35: CPT

## 2022-06-05 PROCEDURE — 93306 TTE W/DOPPLER COMPLETE: CPT | Mod: 26

## 2022-06-05 PROCEDURE — 76770 US EXAM ABDO BACK WALL COMP: CPT | Mod: 26

## 2022-06-05 RX ADMIN — HEPARIN SODIUM 5000 UNIT(S): 5000 INJECTION INTRAVENOUS; SUBCUTANEOUS at 16:38

## 2022-06-05 RX ADMIN — HEPARIN SODIUM 5000 UNIT(S): 5000 INJECTION INTRAVENOUS; SUBCUTANEOUS at 05:25

## 2022-06-05 RX ADMIN — GABAPENTIN 400 MILLIGRAM(S): 400 CAPSULE ORAL at 05:24

## 2022-06-05 RX ADMIN — LORATADINE 10 MILLIGRAM(S): 10 TABLET ORAL at 16:47

## 2022-06-05 RX ADMIN — SODIUM CHLORIDE 100 MILLILITER(S): 9 INJECTION INTRAMUSCULAR; INTRAVENOUS; SUBCUTANEOUS at 09:37

## 2022-06-05 RX ADMIN — SODIUM CHLORIDE 100 MILLILITER(S): 9 INJECTION INTRAMUSCULAR; INTRAVENOUS; SUBCUTANEOUS at 16:37

## 2022-06-05 RX ADMIN — SODIUM CHLORIDE 100 MILLILITER(S): 9 INJECTION INTRAMUSCULAR; INTRAVENOUS; SUBCUTANEOUS at 18:57

## 2022-06-05 RX ADMIN — GABAPENTIN 400 MILLIGRAM(S): 400 CAPSULE ORAL at 16:39

## 2022-06-06 LAB
ALBUMIN SERPL ELPH-MCNC: 3.6 G/DL — SIGNIFICANT CHANGE UP (ref 3.3–5.2)
ALP SERPL-CCNC: 181 U/L — HIGH (ref 40–120)
ALT FLD-CCNC: 835 U/L — HIGH
ANION GAP SERPL CALC-SCNC: 10 MMOL/L — SIGNIFICANT CHANGE UP (ref 5–17)
AST SERPL-CCNC: 380 U/L — HIGH
BILIRUB SERPL-MCNC: 0.4 MG/DL — SIGNIFICANT CHANGE UP (ref 0.4–2)
BUN SERPL-MCNC: 12.5 MG/DL — SIGNIFICANT CHANGE UP (ref 8–20)
CALCIUM SERPL-MCNC: 8.7 MG/DL — SIGNIFICANT CHANGE UP (ref 8.6–10.2)
CHLORIDE SERPL-SCNC: 101 MMOL/L — SIGNIFICANT CHANGE UP (ref 98–107)
CO2 SERPL-SCNC: 31 MMOL/L — HIGH (ref 22–29)
CREAT SERPL-MCNC: 0.73 MG/DL — SIGNIFICANT CHANGE UP (ref 0.5–1.3)
EGFR: 98 ML/MIN/1.73M2 — SIGNIFICANT CHANGE UP
GLUCOSE SERPL-MCNC: 120 MG/DL — HIGH (ref 70–99)
HAV IGM SER-ACNC: SIGNIFICANT CHANGE UP
HBV CORE IGM SER-ACNC: SIGNIFICANT CHANGE UP
HBV SURFACE AG SER-ACNC: SIGNIFICANT CHANGE UP
HCT VFR BLD CALC: 30.7 % — LOW (ref 34.5–45)
HCV AB S/CO SERPL IA: 0.13 S/CO — SIGNIFICANT CHANGE UP (ref 0–0.99)
HCV AB SERPL-IMP: SIGNIFICANT CHANGE UP
HGB BLD-MCNC: 9.5 G/DL — LOW (ref 11.5–15.5)
MCHC RBC-ENTMCNC: 25.1 PG — LOW (ref 27–34)
MCHC RBC-ENTMCNC: 30.9 GM/DL — LOW (ref 32–36)
MCV RBC AUTO: 81 FL — SIGNIFICANT CHANGE UP (ref 80–100)
MRSA PCR RESULT.: SIGNIFICANT CHANGE UP
PLATELET # BLD AUTO: 176 K/UL — SIGNIFICANT CHANGE UP (ref 150–400)
POTASSIUM SERPL-MCNC: 4.2 MMOL/L — SIGNIFICANT CHANGE UP (ref 3.5–5.3)
POTASSIUM SERPL-SCNC: 4.2 MMOL/L — SIGNIFICANT CHANGE UP (ref 3.5–5.3)
PROT SERPL-MCNC: 6 G/DL — LOW (ref 6.6–8.7)
RBC # BLD: 3.79 M/UL — LOW (ref 3.8–5.2)
RBC # FLD: 17.1 % — HIGH (ref 10.3–14.5)
S AUREUS DNA NOSE QL NAA+PROBE: DETECTED
SODIUM SERPL-SCNC: 142 MMOL/L — SIGNIFICANT CHANGE UP (ref 135–145)
WBC # BLD: 3.08 K/UL — LOW (ref 3.8–10.5)
WBC # FLD AUTO: 3.08 K/UL — LOW (ref 3.8–10.5)

## 2022-06-06 PROCEDURE — 71275 CT ANGIOGRAPHY CHEST: CPT | Mod: 26

## 2022-06-06 PROCEDURE — 99233 SBSQ HOSP IP/OBS HIGH 50: CPT

## 2022-06-06 PROCEDURE — 75571 CT HRT W/O DYE W/CA TEST: CPT | Mod: 26

## 2022-06-06 PROCEDURE — 99232 SBSQ HOSP IP/OBS MODERATE 35: CPT

## 2022-06-06 RX ADMIN — HEPARIN SODIUM 5000 UNIT(S): 5000 INJECTION INTRAVENOUS; SUBCUTANEOUS at 17:58

## 2022-06-06 RX ADMIN — GABAPENTIN 400 MILLIGRAM(S): 400 CAPSULE ORAL at 17:58

## 2022-06-06 RX ADMIN — HEPARIN SODIUM 5000 UNIT(S): 5000 INJECTION INTRAVENOUS; SUBCUTANEOUS at 05:06

## 2022-06-06 RX ADMIN — LORATADINE 10 MILLIGRAM(S): 10 TABLET ORAL at 12:57

## 2022-06-06 RX ADMIN — GABAPENTIN 400 MILLIGRAM(S): 400 CAPSULE ORAL at 05:07

## 2022-06-06 NOTE — PROGRESS NOTE ADULT - PROBLEM SELECTOR PLAN 4
.  - likely related to previous ETOH in past  - 1:1
.  - likely related to previous ETOH in past  - 1:1  - denies suicidal ideation

## 2022-06-06 NOTE — PROGRESS NOTE ADULT - PROBLEM SELECTOR PLAN 3
.  - trending up   - denies any suicide idealization  - Behavioral health consult.
.  - trending drown  - Pt admits to heavy drinking prior to admission to [facility].

## 2022-06-07 ENCOUNTER — TRANSCRIPTION ENCOUNTER (OUTPATIENT)
Age: 52
End: 2022-06-07

## 2022-06-07 LAB
ALBUMIN SERPL ELPH-MCNC: 3.8 G/DL — SIGNIFICANT CHANGE UP (ref 3.3–5.2)
ALP SERPL-CCNC: 164 U/L — HIGH (ref 40–120)
ALT FLD-CCNC: 572 U/L — HIGH
ANION GAP SERPL CALC-SCNC: 11 MMOL/L — SIGNIFICANT CHANGE UP (ref 5–17)
AST SERPL-CCNC: 177 U/L — HIGH
BILIRUB SERPL-MCNC: 0.3 MG/DL — LOW (ref 0.4–2)
BUN SERPL-MCNC: 5.6 MG/DL — LOW (ref 8–20)
CALCIUM SERPL-MCNC: 8.9 MG/DL — SIGNIFICANT CHANGE UP (ref 8.6–10.2)
CHLORIDE SERPL-SCNC: 100 MMOL/L — SIGNIFICANT CHANGE UP (ref 98–107)
CK MB CFR SERPL CALC: 2.1 NG/ML — SIGNIFICANT CHANGE UP (ref 0–6.7)
CK SERPL-CCNC: 308 U/L — HIGH (ref 25–170)
CO2 SERPL-SCNC: 29 MMOL/L — SIGNIFICANT CHANGE UP (ref 22–29)
CREAT SERPL-MCNC: 0.58 MG/DL — SIGNIFICANT CHANGE UP (ref 0.5–1.3)
EGFR: 107 ML/MIN/1.73M2 — SIGNIFICANT CHANGE UP
GLUCOSE SERPL-MCNC: 127 MG/DL — HIGH (ref 70–99)
POTASSIUM SERPL-MCNC: 3.7 MMOL/L — SIGNIFICANT CHANGE UP (ref 3.5–5.3)
POTASSIUM SERPL-SCNC: 3.7 MMOL/L — SIGNIFICANT CHANGE UP (ref 3.5–5.3)
PROT SERPL-MCNC: 6.2 G/DL — LOW (ref 6.6–8.7)
SODIUM SERPL-SCNC: 140 MMOL/L — SIGNIFICANT CHANGE UP (ref 135–145)

## 2022-06-07 PROCEDURE — 78452 HT MUSCLE IMAGE SPECT MULT: CPT | Mod: 26

## 2022-06-07 PROCEDURE — 93018 CV STRESS TEST I&R ONLY: CPT

## 2022-06-07 PROCEDURE — 99222 1ST HOSP IP/OBS MODERATE 55: CPT

## 2022-06-07 PROCEDURE — 99233 SBSQ HOSP IP/OBS HIGH 50: CPT | Mod: 25

## 2022-06-07 PROCEDURE — 99232 SBSQ HOSP IP/OBS MODERATE 35: CPT

## 2022-06-07 PROCEDURE — 93016 CV STRESS TEST SUPVJ ONLY: CPT

## 2022-06-07 RX ORDER — IBUPROFEN 200 MG
1 TABLET ORAL
Qty: 0 | Refills: 0 | DISCHARGE

## 2022-06-07 RX ORDER — GABAPENTIN 400 MG/1
1 CAPSULE ORAL
Qty: 0 | Refills: 0 | DISCHARGE
Start: 2022-06-07

## 2022-06-07 RX ORDER — CYCLOBENZAPRINE HYDROCHLORIDE 10 MG/1
1 TABLET, FILM COATED ORAL
Qty: 0 | Refills: 0 | DISCHARGE

## 2022-06-07 RX ORDER — AMLODIPINE BESYLATE 2.5 MG/1
10 TABLET ORAL DAILY
Refills: 0 | Status: DISCONTINUED | OUTPATIENT
Start: 2022-06-07 | End: 2022-06-08

## 2022-06-07 RX ORDER — VENLAFAXINE HCL 75 MG
150 CAPSULE, EXT RELEASE 24 HR ORAL DAILY
Refills: 0 | Status: DISCONTINUED | OUTPATIENT
Start: 2022-06-07 | End: 2022-06-08

## 2022-06-07 RX ORDER — VENLAFAXINE HCL 75 MG
1 CAPSULE, EXT RELEASE 24 HR ORAL
Qty: 0 | Refills: 0 | DISCHARGE

## 2022-06-07 RX ORDER — GABAPENTIN 400 MG/1
1 CAPSULE ORAL
Qty: 0 | Refills: 0 | DISCHARGE

## 2022-06-07 RX ORDER — CYCLOBENZAPRINE HYDROCHLORIDE 10 MG/1
1 TABLET, FILM COATED ORAL
Qty: 0 | Refills: 0 | DISCHARGE
Start: 2022-06-07

## 2022-06-07 RX ORDER — LORATADINE 10 MG/1
1 TABLET ORAL
Qty: 0 | Refills: 0 | DISCHARGE

## 2022-06-07 RX ORDER — VENLAFAXINE HCL 75 MG
1 CAPSULE, EXT RELEASE 24 HR ORAL
Qty: 0 | Refills: 0 | DISCHARGE
Start: 2022-06-07

## 2022-06-07 RX ADMIN — GABAPENTIN 400 MILLIGRAM(S): 400 CAPSULE ORAL at 17:39

## 2022-06-07 RX ADMIN — GABAPENTIN 400 MILLIGRAM(S): 400 CAPSULE ORAL at 04:55

## 2022-06-07 RX ADMIN — Medication 150 MILLIGRAM(S): at 12:53

## 2022-06-07 RX ADMIN — HEPARIN SODIUM 5000 UNIT(S): 5000 INJECTION INTRAVENOUS; SUBCUTANEOUS at 17:39

## 2022-06-07 RX ADMIN — HEPARIN SODIUM 5000 UNIT(S): 5000 INJECTION INTRAVENOUS; SUBCUTANEOUS at 04:56

## 2022-06-07 RX ADMIN — LORATADINE 10 MILLIGRAM(S): 10 TABLET ORAL at 12:53

## 2022-06-07 NOTE — BH CONSULTATION LIAISON ASSESSMENT NOTE - NSICDXBHSECONDARYDX_PSY_ALL_CORE
Elevated troponin   R77.8  Acute renal failure (ARF)   N17.9  Suicide by drug overdose   T50.902A  Elevated liver function tests   R79.89

## 2022-06-07 NOTE — DISCHARGE NOTE PROVIDER - ATTENDING DISCHARGE PHYSICAL EXAMINATION:
GENERAL: Middle age female looking comfortable   HEENT: PERRL, +EOMI  NECK: soft, Supple, No JVD,   CHEST/LUNG: Clear to auscultate bilaterally; No wheezing  HEART: S1S2+, Regular rate and rhythm; No murmurs  ABDOMEN: Soft, Nontender, Nondistended; Bowel sounds present  EXTREMITIES:  1+ Peripheral Pulses, No edema  SKIN: No rashes or lesions  NEURO: AAOX3, no focal deficits, no motor r sensory loss  PSYCH: normal mood

## 2022-06-07 NOTE — DISCHARGE NOTE PROVIDER - HOSPITAL COURSE
52F who was previously admitted to [facility] after a suicide attempt who was recently started on buprenorphine/naloxone who was found to be unresponsive and hypoxic for which naloxone was administered. Laboratory studies later noted acute kidney injury with hyperkalemia, elevated troponin level, and transaminitis. Patient seen by cardio and had a ct angio chest which is negative for PE.  ct coronary done which was negative for heart disease.     tte:  Summary:   1. Normal left atrial size.   2. Normal wall motion. Left ventricular ejection fraction, by visual   estimation, is 70 to 75%. Grade I diastolic dysfunction.      Unresponsiveness / Hypoxia - Suspect secondary to medications (buprenorphine/naloxone, venlafaxine). Improved with naloxone administration.       Acute kidney injury / Hyperkalemia / Hyponatremia - Hydrochlorothiazide discontinued  -  Lokelma administered for hyperkalemia. EKG was without acute changes    Transaminitis - improved  -  No abdominal pain or elevated bilirubin level, will monitor, hepatitis panel is pending, Liver enzymes are trending down,    Elevated troponin level - Noted in the setting of hypoxia and acute kidney injury. EKG was without acute findings. cardio followed  - for stress on 6/7     Suicide attempt - On venlafaxine. back to [facility]    Neuropathy - On gabapentin. (reduced from normal dose)    Hypertension - On amlodipine  dc hctz      ct angio, ct coronary all negative  tte - within normal limits    medications reduced such as gabapentin , suboxone to be resumed at [facility].      52F who was previously admitted to [facility] after a suicide attempt who was recently started on buprenorphine/naloxone who was found to be unresponsive and hypoxic for which naloxone was administered. Laboratory studies later noted acute kidney injury with hyperkalemia, elevated troponin level, and transaminitis. Patient seen by cardio and had a ct angio chest which is negative for PE.  ct coronary done which was negative for heart disease.     tte:  Summary:   1. Normal left atrial size.   2. Normal wall motion. Left ventricular ejection fraction, by visual   estimation, is 70 to 75%. Grade I diastolic dysfunction.    Unresponsiveness / Hypoxia - Suspect secondary to medications (buprenorphine/naloxone, venlafaxine). Improved with naloxone administration.   [facility] to reconsider suboxone or other medication  - stress test negative    Acute kidney injury / Hyperkalemia / Hyponatremia - Hydrochlorothiazide discontinued  -  Lokelma administered for hyperkalemia. EKG was without acute changes    Transaminitis - improved  -  No abdominal pain or elevated bilirubin level,  Liver enzymes are trending down,    Elevated troponin level - Noted in the setting of hypoxia and acute kidney injury. EKG was without acute findings. cardio followed  - for stress on 6/7     Suicide attempt - On venlafaxine. back to [facility]    Neuropathy - On gabapentin. (reduced from normal dose)    Hypertension - On amlodipine  dc hctz      ct angio, ct coronary all negative  tte - within normal limits    medications reduced such as gabapentin , suboxone to be resumed at [facility].

## 2022-06-07 NOTE — BH CONSULTATION LIAISON ASSESSMENT NOTE - HPI (INCLUDE ILLNESS QUALITY, SEVERITY, DURATION, TIMING, CONTEXT, MODIFYING FACTORS, ASSOCIATED SIGNS AND SYMPTOMS)
This is a 53yo F, , domiciled at home with  and two kids, retired, with a hx of depression, ADHD, HTN, chronic back pain, and alcohol abuse, BIBEMS from [facility] (6/4) after being found unresponsive. The pt states she has been abusing alcohol for the past 4 years stating intermittent drinking with beer progressing to vodka. Pt also states shes been seeing a psych NP for therapy and treatment for depression for 6 years, meeting monthly. The pts  found her drunk, pt became upset and thought her  was going to leave her, so she resorted to taking a bottle of muscle relaxers (15 tablets 2mg Tizanidine) as a suicide attempt. The pt was then brought to [place], where she became medically stable, then transferred to [facility] for rehab. Pt was taking percocet for chronic back pain which was discontinued on 5/31. Pt was weaned off of alcohol with ativan, then began Suboxone (6/2) while at [facility], they increased her Suboxone dosage on 6/3 for maintenance therapy. Pt sates then she "woke up with the medical team around me". Pt denies taking or sneaking anything at [facility]. Attributes her collapse to the Suboxone use. Pt was then transferred to . At this time the patient seems very hopeful and motivated, she states she "wants to get good and back to my family". Pt is requesting to be transferred back to [facility] to continue rehab plan. Denies SI/HI, A/V/H.

## 2022-06-07 NOTE — BH CONSULTATION LIAISON ASSESSMENT NOTE - SUMMARY
This is a 53yo F, , domiciled at home with  and two kids, retired, with a hx of depression, ADHD, HTN, chronic back pain, and alcohol abuse, BIBEMS from [facility] (6/4) after being found unresponsive. The pt states she has been abusing alcohol for the past 4 years stating intermittent drinking with beer progressing to vodka. At this time the patient seems very hopeful and motivated, she states she "wants to get good and back to my family". Pt is requesting to be transferred back to [facility] to continue rehab plan. Denies SI/HI, A/V/H.

## 2022-06-07 NOTE — BH CONSULTATION LIAISON ASSESSMENT NOTE - NSBHCONSULTFOLLOWAFTERCARE_PSY_A_CORE FT
return to [facility] to continue with rehab program, treatment for depression s/p suicide attempt

## 2022-06-07 NOTE — BH CONSULTATION LIAISON ASSESSMENT NOTE - DESCRIPTION
53 yo F, , retired, living with  and two children, pt has had alcohol abuse x4 years, denies tobacco, recreational drugs, with the exception of occasional marijuana use for sleep

## 2022-06-07 NOTE — PROGRESS NOTE ADULT - PROBLEM SELECTOR PLAN 2
.  - 1:1 at bedsides  - no active suicide ideation
.  - IVF   - improving   - renal sono- no hydronephrosis   - nephrology
.  - normalized  - renal sono- no hydronephrosis

## 2022-06-07 NOTE — BH CONSULTATION LIAISON ASSESSMENT NOTE - NSBHCHARTREVIEWVS_PSY_A_CORE FT
Vital Signs Last 24 Hrs  T(C): 36.8 (07 Jun 2022 04:40), Max: 36.9 (06 Jun 2022 19:45)  T(F): 98.2 (07 Jun 2022 04:40), Max: 98.5 (06 Jun 2022 19:45)  HR: 69 (07 Jun 2022 04:40) (69 - 73)  BP: 137/78 (07 Jun 2022 04:40) (137/78 - 155/89)  BP(mean): --  RR: 18 (07 Jun 2022 04:40) (17 - 18)  SpO2: 97% (07 Jun 2022 04:40) (97% - 97%)

## 2022-06-07 NOTE — BH CONSULTATION LIAISON ASSESSMENT NOTE - NSICDXPASTMEDICALHX_GEN_ALL_CORE_FT
PAST MEDICAL HISTORY:  ADHD     Alcohol abuse     Chronic back pain     Hypertension     Major depression

## 2022-06-07 NOTE — PROGRESS NOTE ADULT - NS ATTEND AMEND GEN_ALL_CORE FT
normal stress test.   no further cardiac work up is needed.   no coronary artery calcification.
organ dysfunciton improved with fluids.   Ct calcium score 0.  plan for stress test for evaluation of coronary artery disease .

## 2022-06-07 NOTE — BH CONSULTATION LIAISON ASSESSMENT NOTE - CURRENT MEDICATION
MEDICATIONS  (STANDING):  amLODIPine   Tablet 10 milliGRAM(s) Oral daily  gabapentin 400 milliGRAM(s) Oral two times a day  heparin   Injectable 5000 Unit(s) SubCutaneous every 12 hours  loratadine 10 milliGRAM(s) Oral daily  venlafaxine XR. 150 milliGRAM(s) Oral daily    MEDICATIONS  (PRN):  cyclobenzaprine 5 milliGRAM(s) Oral every 12 hours PRN Muscle Spasm

## 2022-06-07 NOTE — BH CONSULTATION LIAISON ASSESSMENT NOTE - NSBHCHARTREVIEWLAB_PSY_A_CORE FT
9.5    3.08  )-----------( 176      ( 06 Jun 2022 06:56 )             30.7     06-07    140  |  100  |  5.6<L>  ----------------------------<  127<H>  3.7   |  29.0  |  0.58    Ca    8.9      07 Jun 2022 13:36    TPro  6.2<L>  /  Alb  3.8  /  TBili  0.3<L>  /  DBili  x   /  AST  177<H>  /  ALT  572<H>  /  AlkPhos  164<H>  06-07    LIVER FUNCTIONS - ( 07 Jun 2022 13:36 )  Alb: 3.8 g/dL / Pro: 6.2 g/dL / ALK PHOS: 164 U/L / ALT: 572 U/L / AST: 177 U/L / GGT: x

## 2022-06-07 NOTE — DISCHARGE NOTE PROVIDER - NSDCCPCAREPLAN_GEN_ALL_CORE_FT
PRINCIPAL DISCHARGE DIAGNOSIS  Diagnosis: NSTEMI (non-ST elevation myocardial infarction)  Assessment and Plan of Treatment:       SECONDARY DISCHARGE DIAGNOSES  Diagnosis: PETER (acute kidney injury)  Assessment and Plan of Treatment:     Diagnosis: Hyperkalemia  Assessment and Plan of Treatment:     Diagnosis: Drug overdose  Assessment and Plan of Treatment:     Diagnosis: Rhabdomyolysis  Assessment and Plan of Treatment:     Diagnosis: Depression, reactive  Assessment and Plan of Treatment:     Diagnosis: Transaminitis  Assessment and Plan of Treatment:

## 2022-06-07 NOTE — BH CONSULTATION LIAISON ASSESSMENT NOTE - DETAILS
[facility] for the past 2 weeks s/p suicide attempt  Brother- schizophrenia and bipolar

## 2022-06-07 NOTE — DISCHARGE NOTE PROVIDER - NSDCMRMEDTOKEN_GEN_ALL_CORE_FT
cyclobenzaprine 5 mg oral tablet: 1 tab(s) orally every 12 hours, As needed, Muscle Spasm  folic acid 1 mg oral tablet: 1 tab(s) orally once a day  gabapentin 400 mg oral capsule: 1 cap(s) orally 2 times a day  Norvasc 10 mg oral tablet: 1 tab(s) orally once a day  Suboxone 8 mg-2 mg sublingual film: 1 film(s) sublingual 2 times a day  venlafaxine 150 mg oral capsule, extended release: 1 cap(s) orally once a day

## 2022-06-08 ENCOUNTER — TRANSCRIPTION ENCOUNTER (OUTPATIENT)
Age: 52
End: 2022-06-08

## 2022-06-08 VITALS
DIASTOLIC BLOOD PRESSURE: 96 MMHG | OXYGEN SATURATION: 95 % | TEMPERATURE: 98 F | RESPIRATION RATE: 18 BRPM | HEART RATE: 90 BPM | SYSTOLIC BLOOD PRESSURE: 151 MMHG

## 2022-06-08 LAB
MYOGLOBIN UR-MCNC: 5 NG/ML — SIGNIFICANT CHANGE UP (ref 0–13)
SARS-COV-2 RNA SPEC QL NAA+PROBE: SIGNIFICANT CHANGE UP

## 2022-06-08 PROCEDURE — 83874 ASSAY OF MYOGLOBIN: CPT

## 2022-06-08 PROCEDURE — 99239 HOSP IP/OBS DSCHRG MGMT >30: CPT

## 2022-06-08 PROCEDURE — 82550 ASSAY OF CK (CPK): CPT

## 2022-06-08 PROCEDURE — 85027 COMPLETE CBC AUTOMATED: CPT

## 2022-06-08 PROCEDURE — 85025 COMPLETE CBC W/AUTO DIFF WBC: CPT

## 2022-06-08 PROCEDURE — C8929: CPT

## 2022-06-08 PROCEDURE — 80074 ACUTE HEPATITIS PANEL: CPT

## 2022-06-08 PROCEDURE — 71045 X-RAY EXAM CHEST 1 VIEW: CPT

## 2022-06-08 PROCEDURE — 36415 COLL VENOUS BLD VENIPUNCTURE: CPT

## 2022-06-08 PROCEDURE — 80053 COMPREHEN METABOLIC PANEL: CPT

## 2022-06-08 PROCEDURE — 82553 CREATINE MB FRACTION: CPT

## 2022-06-08 PROCEDURE — 85610 PROTHROMBIN TIME: CPT

## 2022-06-08 PROCEDURE — 86140 C-REACTIVE PROTEIN: CPT

## 2022-06-08 PROCEDURE — 84300 ASSAY OF URINE SODIUM: CPT

## 2022-06-08 PROCEDURE — 84484 ASSAY OF TROPONIN QUANT: CPT

## 2022-06-08 PROCEDURE — 96374 THER/PROPH/DIAG INJ IV PUSH: CPT

## 2022-06-08 PROCEDURE — 75571 CT HRT W/O DYE W/CA TEST: CPT

## 2022-06-08 PROCEDURE — 80048 BASIC METABOLIC PNL TOTAL CA: CPT

## 2022-06-08 PROCEDURE — U0003: CPT

## 2022-06-08 PROCEDURE — 93017 CV STRESS TEST TRACING ONLY: CPT

## 2022-06-08 PROCEDURE — 85379 FIBRIN DEGRADATION QUANT: CPT

## 2022-06-08 PROCEDURE — 84443 ASSAY THYROID STIM HORMONE: CPT

## 2022-06-08 PROCEDURE — 76770 US EXAM ABDO BACK WALL COMP: CPT

## 2022-06-08 PROCEDURE — 87640 STAPH A DNA AMP PROBE: CPT

## 2022-06-08 PROCEDURE — A9500: CPT

## 2022-06-08 PROCEDURE — 93005 ELECTROCARDIOGRAM TRACING: CPT

## 2022-06-08 PROCEDURE — 84133 ASSAY OF URINE POTASSIUM: CPT

## 2022-06-08 PROCEDURE — 78452 HT MUSCLE IMAGE SPECT MULT: CPT

## 2022-06-08 PROCEDURE — U0005: CPT

## 2022-06-08 PROCEDURE — 71275 CT ANGIOGRAPHY CHEST: CPT

## 2022-06-08 PROCEDURE — 99285 EMERGENCY DEPT VISIT HI MDM: CPT | Mod: 25

## 2022-06-08 PROCEDURE — 87641 MR-STAPH DNA AMP PROBE: CPT

## 2022-06-08 PROCEDURE — 83935 ASSAY OF URINE OSMOLALITY: CPT

## 2022-06-08 PROCEDURE — 99232 SBSQ HOSP IP/OBS MODERATE 35: CPT

## 2022-06-08 PROCEDURE — 81001 URINALYSIS AUTO W/SCOPE: CPT

## 2022-06-08 PROCEDURE — 83880 ASSAY OF NATRIURETIC PEPTIDE: CPT

## 2022-06-08 PROCEDURE — 85652 RBC SED RATE AUTOMATED: CPT

## 2022-06-08 RX ADMIN — HEPARIN SODIUM 5000 UNIT(S): 5000 INJECTION INTRAVENOUS; SUBCUTANEOUS at 05:15

## 2022-06-08 RX ADMIN — Medication 150 MILLIGRAM(S): at 11:09

## 2022-06-08 RX ADMIN — LORATADINE 10 MILLIGRAM(S): 10 TABLET ORAL at 11:10

## 2022-06-08 RX ADMIN — AMLODIPINE BESYLATE 10 MILLIGRAM(S): 2.5 TABLET ORAL at 05:15

## 2022-06-08 RX ADMIN — GABAPENTIN 400 MILLIGRAM(S): 400 CAPSULE ORAL at 05:15

## 2022-06-08 NOTE — PROGRESS NOTE ADULT - PROVIDER SPECIALTY LIST ADULT
Hospitalist
Cardiology
Nephrology
Nephrology
Cardiology

## 2022-06-08 NOTE — PROGRESS NOTE ADULT - PROBLEM SELECTOR PLAN 1
.  - likely not cardiac   normal stress test   - CCTA with zero calcium score     No further in-patient cardiac work-up/management is needed.  Follow-up in cardiology office in 2 weeks.  will sign off. oplease call for any questions,.
.    Likely does not represent cardiac ischemic but would repeat ekg in am and trend trop  + cardiac rub on exam would get crp esr and Echo   Further workup dependent on above  will consider ischemic work up this admit.
.  - likely not cardiac  - NST performed pending read  - CCTA with zero calcium score  - TTE EF 75%, no pericardial effusion.
.  - likely not cardiac  - NST tomorrow, CT calcium score tomorrow  - NPO after MN  - TTE EF 75%, no pericardial effusion

## 2022-06-08 NOTE — PROGRESS NOTE ADULT - SUBJECTIVE AND OBJECTIVE BOX
[facility] DIVISION OF KIDNEY DISEASES AND HYPERTENSION -- FOLLOW UP NOTE  --------------------------------------------------------------------------------  Chief Complaint:   lidia    24 hour events/subjective:  no acute event  on 1;1 observation for SI      PAST HISTORY  --------------------------------------------------------------------------------  No significant changes to PMH, PSH, FHx, SHx, unless otherwise noted    ALLERGIES & MEDICATIONS  --------------------------------------------------------------------------------  Allergies  No Known Allergies        Standing Inpatient Medications  gabapentin 400 milliGRAM(s) Oral two times a day  heparin   Injectable 5000 Unit(s) SubCutaneous every 12 hours  loratadine 10 milliGRAM(s) Oral daily  sodium chloride 0.9%. 1000 milliLiter(s) IV Continuous <Continuous>    PRN Inpatient Medications  cyclobenzaprine 5 milliGRAM(s) Oral every 12 hours PRN      REVIEW OF SYSTEMS  --------------------------------------------------------------------------------  Gen: No weight changes, fatigue, fevers/chills, weakness  Skin: No rashes  Head/Eyes/Ears/Mouth: No headache; Normal hearing; Normal vision w/o blurriness; No sinus pain/discomfort, sore throat  Respiratory: No dyspnea, cough, wheezing, hemoptysis  CV: No chest pain, PND, orthopnea  GI: No abdominal pain, diarrhea, constipation, nausea, vomiting, melena, hematochezia  : No increased frequency, dysuria, hematuria, nocturia  MSK: No joint pain/swelling; no back pain; no edema  Neuro: No dizziness/lightheadedness, weakness, seizures, numbness, tingling  Heme: No easy bruising or bleeding  Endo: No heat/cold intolerance    All other systems were reviewed and are negative, except as noted.    VITALS/PHYSICAL EXAM  --------------------------------------------------------------------------------  T(C): 36.7 (06-06-22 @ 04:47), Max: 36.8 (06-05-22 @ 15:39)  HR: 61 (06-06-22 @ 04:47) (61 - 98)  BP: 123/74 (06-06-22 @ 04:47) (123/74 - 161/92)  RR: 16 (06-06-22 @ 04:47) (16 - 18)  SpO2: 94% (06-06-22 @ 04:47) (94% - 98%)  Wt(kg): --        06-05-22 @ 07:01  -  06-06-22 @ 07:00  --------------------------------------------------------  IN: 1440 mL / OUT: 300 mL / NET: 1140 mL    06-06-22 @ 07:01  -  06-06-22 @ 12:58  --------------------------------------------------------  IN: 236 mL / OUT: 0 mL / NET: 236 mL      Physical Exam:  	Gen: NAD  	HEENT: supple neck  	Pulm: CTA B/L  	CV: RRR, S1S2; no rub  	Back: No spinal or CVA tenderness  	Abd: +BS, soft, nontender/nondistended  	: No suprapubic tenderness  	UE: Warm, no edema; no asterixis  	LE: Warm, no edema  	Neuro: No focal deficit  	Psych: Normal affect and mood  	Skin: Warm      LABS/STUDIES  --------------------------------------------------------------------------------              9.5    3.08  >-----------<  176      [06-06-22 @ 06:56]              30.7     142  |  101  |  12.5  ----------------------------<  120      [06-06-22 @ 06:56]  4.2   |  31.0  |  0.73        Ca     8.7     [06-06-22 @ 06:56]    TPro  6.0  /  Alb  3.6  /  TBili  0.4  /  DBili  x   /  AST  380  /  ALT  835  /  AlkPhos  181  [06-06-22 @ 06:56]    PT/INR: PT 12.8 , INR 1.10       [06-04-22 @ 16:43]    Troponin 0.09      [06-04-22 @ 21:41]    Creatinine Trend:  SCr 0.73 [06-06 @ 06:56]  SCr 1.36 [06-05 @ 03:02]  SCr 1.64 [06-04 @ 21:41]  SCr 2.18 [06-04 @ 11:38]    Urinalysis - [06-04-22 @ 21:28]      Color Yellow / Appearance Clear / SG 1.020 / pH 5.0      Gluc Negative / Ketone Trace  / Bili Negative / Urobili 1       Blood Moderate / Protein Negative / Leuk Est Negative / Nitrite Negative      RBC 0-2 / WBC 0-2 / Hyaline  / Gran  / Sq Epi  / Non Sq Epi Occasional / Bacteria Occasional    Urine Sodium 60      [06-05-22 @ 21:12]  Urine Potassium 11      [06-05-22 @ 21:12]  Urine Osmolality 382      [06-05-22 @ 21:13]    TSH 0.49      [06-05-22 @ 03:02]      
Patient is a 52y old  Female who presents with a chief complaint of     HPI:    · Chief Complaint: The patient is a 52y Female complaining of unresponsive.    · HPI Objective Statement: Pt is a 51 yo F BIB EMS from [facility] for unresponsive episode.  Pt is at [facility] after a suicide attempt in which she drank a bottle of vodka and took muscle relaxants in an attempt to kill herself. Pt went to [facility] and was sent to [facility] from there.  Pt was started on Suboxone yesterday and today they found her unresponsive in her bed. Pt was hypoxic and EMS was called and patient was administered 1 mg of Narcan and woke up.  Pt with no complaints currently.      PAST MEDICAL & SURGICAL HISTORY: Hypertension,     FAMILY HISTORY: No ESRD,    Social History: Alcoholism,     MEDICATIONS  (STANDING):    calcium gluconate IVPB 1 Gram(s) IV Intermittent every 1 hour  sodium chloride 0.9%. 1000 milliLiter(s) (100 mL/Hr) IV Continuous <Continuous>    MEDICATIONS  (PRN):    Allergies    No Known Allergies    Intolerances    REVIEW OF SYSTEMS:    CONSTITUTIONAL: No fever, weight loss, + fatigue  EYES: No eye pain, visual disturbances, or discharge  ENMT:  No difficulty hearing, tinnitus, vertigo; No sinus or throat pain  NECK: No pain or stiffness  BREASTS: No pain, masses, or nipple discharge  RESPIRATORY: No cough, wheezing, chills or hemoptysis; No shortness of breath  CARDIOVASCULAR: No chest pain, palpitations, dizziness, or leg swelling  GASTROINTESTINAL: No abdominal or epigastric pain. No nausea, vomiting, or hematemesis; No diarrhea or constipation. No melena or hematochezia.  GENITOURINARY: No dysuria, frequency, hematuria, or incontinence  NEUROLOGICAL: No headaches, memory loss, loss of strength, numbness, + tremors  SKIN: No itching, burning, rashes, or lesions   LYMPH NODES: No enlarged glands  ENDOCRINE: No heat or cold intolerance; No hair loss  MUSCULOSKELETAL: No joint pain or swelling; ++ muscle, back,  No extremity pain  PSYCHIATRIC: +++ depression, anxiety, mood swings,  difficulty sleeping  HEME/LYMPH: No easy bruising, or bleeding gums  ALLERGY AND IMMUNOLOGIC: No hives or eczema    Vital Signs Last 24 Hrs  T(C): 37.1 (04 Jun 2022 11:08), Max: 37.1 (04 Jun 2022 11:08)  T(F): 98.7 (04 Jun 2022 11:08), Max: 98.7 (04 Jun 2022 11:08)  HR: 81 (04 Jun 2022 11:08) (81 - 81)  BP: 127/71 (04 Jun 2022 11:08) (127/71 - 127/71)  RR: 18 (04 Jun 2022 11:08) (18 - 18)  SpO2: 98% (04 Jun 2022 11:08) (98% - 98%)    PHYSICAL EXAM:    GENERAL: NAD, well-groomed, well-developed , Alert, {Pale }  HEAD:  Atraumatic, Normocephalic  EYES: EOMI, PERRLA, conjunctiva and sclera clear  ENMT: Dry mucous membranes, No lesions  NECK: Supple, No JVD,   NERVOUS SYSTEM:  Alert & Oriented X3, Good concentration;   CHEST/LUNG: Clear to percussion bilaterally; No rales, rhonchi, wheezing, or rubs  HEART: Regular rate and rhythm; No murmurs, rubs, or gallops  ABDOMEN: Soft, Nontender, Nondistended; Bowel sounds present  EXTREMITIES:  2+ Peripheral Pulses, No clubbing, cyanosis, or edema  LYMPH: No lymphadenopathy noted  SKIN: No rashes or lesions    LABS:                        10.8   7.05  )-----------( 230      ( 04 Jun 2022 11:38 )             35.5     06-04    129<L>  |  89<L>  |  27.5<H>  ----------------------------<  136<H>  6.2<HH>   |  22.0  |  2.18<H>    Ca    8.4<L>      04 Jun 2022 11:38    TPro  7.5  /  Alb  4.6  /  TBili  0.9  /  DBili  x   /  AST  682<H>  /  ALT  898<H>  /  AlkPhos  242<H>  06-04    RADIOLOGY & ADDITIONAL TESTS:  eGFR: 26: The estimated glomerular filtration rate (eGFR) is calculated using the   2021 CKD-EPI creatinine equation, which does not have a coefficient for   race and is validated in individuals 18 years of age and older (N Engl J   Med 2021; 385:2500-6399). Creatinine-based eGFR may be inaccurate in   various situations including but not limited to extremes of muscle mass,   altered dietary protein intake, or medications that affect renal tubular   creatinine secretion. mL/min/1.73m2 (06.04.22 @ 11:38)    Kidney and Bladder (06.05.22 @ 09:03)     ACC: [id number] EXAM:  US KIDNEYS AND BLADDER                          PROCEDURE DATE:  06/05/2022      INTERPRETATION:  CLINICAL INFORMATION: Acute kidney injury.    COMPARISON: None available.    TECHNIQUE: Sonography of the kidneys and bladder.    FINDINGS:  Right kidney: 10.0 cm. No hydronephrosis.    Left kidney: 10.6 cm. No hydronephrosis.    Urinary bladder: Within normal limits.    IMPRESSION:  No hydronephrosis of either kidney.    12 Lead ECG (06.04.22 @ 12:35)     Ventricular Rate 75 BPM    Atrial Rate 75 BPM    P-R Interval 130 ms    QRS Duration 72 ms    Q-T Interval 406 ms    QTC Calculation(Bazett) 453 ms    P Axis 18 degrees    R Axis 15 degrees    T Axis 3 degrees    Diagnosis Line *** Poor data quality, interpretation may be adversely affected  Normal sinus rhythm  Normal ECG  Troponin T, Serum: 0.09: Reference Interval for Troponin T   Less than 0.06 ng/mL - includes the 99th percentile of a healthy   population at a method C.V. of 10% or less.   NOTE: Troponin T is measured by the Roche CLIA method and these   results are not interchangable with Troponin I results since they are   different assays with different reference intervals. ng/mL (06.04.22 @ 21:41)       Historical Values  Troponin T, Serum: 0.09: Reference Interval for Troponin T   Less than 0.06 ng/mL - includes the 99th percentile of a healthy   population at a method C.V. of 10% or less.   NOTE: Troponin T is measured by the Roche CLIA method and these   results are not interchangable with Troponin I results since they are   different assays with different reference intervals. ng/mL (06.04.22 @ 21:41)   Troponin T, Serum: 0.13 ng/mL (06.04.22 @ 17:37)   Troponin T, Serum: 0.19: Reference Interval for Troponin T   Less than 0.06 ng/mL - includes the 99th percentile of a healthy   population at a method C.V. of 10% or less.   NOTE: Troponin T is measured by the Roche CLIA method and these   results are not interchangable with Troponin I results since they are   different assays with different reference intervals. ng/mL (06.04.22 @ 11:38)   PETER -Pre Renal,  ? Baseline     Hyperkalemia,     Hypovolemia,     labs and imaging reviewed.  EKG NSR with no ST-T changes.  D-dimer minimally elevated unable to do CTA as patient has poor kidney function currently also unlikely PE.  trop elevated as well as hyperkalemia in setting of PETER.    Patient treated for hyperkalemia with insulin, dextrose, IVF, lokelma.      Case d/w Dr. Moises Steinberg for admission.    Labs, EKG, Imaging Studies, Medications Reviewed,      Rec :     Avoidance of nephrotoxins/nephrotoxin medication adjustment  Medication dosage adjustment for kidney function  Continuous IVF administration ,  Close monitoring of serum Creatinine and UO  Acid-base, electrolyte and albumin status management  Consider alternatives to radiocontrast administration  Optimizing hemodynamics:    WMirandaU ,  D/W Dr. Steinberg,           
CC: Elevated trop    INTERVAL HPI/OVERNIGHT EVENTS: Patient seen and examined. Returned from [facility]. Denies chest pain, SOB, dizziness, nausea, vomiting, fever, chills. C/O left lower extremity pain, which she states is chronic, due to "bulging discs". 1:1 remains for patient safety    Vital Signs Last 24 Hrs  T(C): 36.8 (07 Jun 2022 04:40), Max: 36.9 (06 Jun 2022 13:15)  T(F): 98.2 (07 Jun 2022 04:40), Max: 98.5 (06 Jun 2022 19:45)  HR: 69 (07 Jun 2022 04:40) (64 - 73)  BP: 137/78 (07 Jun 2022 04:40) (130/76 - 155/89)  BP(mean): --  RR: 18 (07 Jun 2022 04:40) (16 - 18)  SpO2: 97% (07 Jun 2022 04:40) (96% - 97%)    PHYSICAL EXAM:    GENERAL: NAD, sitting up in bed  HEENT: PERRL, +EOMI  NECK: soft, Supple, No JVD,   CHEST/LUNG: Clear to auscultate bilaterally; No wheezing  HEART: S1S2+, Regular rate and rhythm; No murmurs  ABDOMEN: Soft, Nontender, Nondistended; Bowel sounds present  EXTREMITIES:  1+ Peripheral Pulses, No edema  SKIN: No rashes or lesions  NEURO: AAOX3, no focal deficits, no motor r sensory loss    I&O's Detail    06 Jun 2022 07:01  -  07 Jun 2022 07:00  --------------------------------------------------------  IN:    Oral Fluid: 734 mL  Total IN: 734 mL    OUT:    Voided (mL): 800 mL  Total OUT: 800 mL    Total NET: -66 mL                                    9.5    3.08  )-----------( 176      ( 06 Jun 2022 06:56 )             30.7     06 Jun 2022 06:56    142    |  101    |  12.5   ----------------------------<  120    4.2     |  31.0   |  0.73     Ca    8.7        06 Jun 2022 06:56    TPro  6.0    /  Alb  3.6    /  TBili  0.4    /  DBili  x      /  AST  380    /  ALT  835    /  AlkPhos  181    06 Jun 2022 06:56      CAPILLARY BLOOD GLUCOSE        LIVER FUNCTIONS - ( 06 Jun 2022 06:56 )  Alb: 3.6 g/dL / Pro: 6.0 g/dL / ALK PHOS: 181 U/L / ALT: 835 U/L / AST: 380 U/L / GGT: x               MEDICATIONS  (STANDING):  amLODIPine   Tablet 10 milliGRAM(s) Oral daily  gabapentin 400 milliGRAM(s) Oral two times a day  heparin   Injectable 5000 Unit(s) SubCutaneous every 12 hours  loratadine 10 milliGRAM(s) Oral daily  sodium chloride 0.9%. 1000 milliLiter(s) (100 mL/Hr) IV Continuous <Continuous>  venlafaxine XR. 150 milliGRAM(s) Oral daily    MEDICATIONS  (PRN):  cyclobenzaprine 5 milliGRAM(s) Oral every 12 hours PRN Muscle Spasm      RADIOLOGY & ADDITIONAL TESTS:  
[address]    [id number]    52y      Female    Patient is a 52y old  Female who presents with a chief complaint of Elevated trop (2022 16:03)      INTERVAL HPI/OVERNIGHT EVENTS:      Patient is alert and oriented, denies any chest pain, sob, dizziness, palpitation      REVIEW OF SYSTEMS:    CONSTITUTIONAL: No fever, fatigue  RESPIRATORY: No cough, No shortness of breath  CARDIOVASCULAR: No chest pain, palpitations  GASTROINTESTINAL: No abdominal, No nausea, vomiting  NEUROLOGICAL: No headaches,  loss of strength.  MISCELLANEOUS: No joint swelling or pain       Vital Signs Last 24 Hrs  T(C): 37 (2022 08:29), Max: 37.9 (2022 19:44)  T(F): 98.6 (2022 08:29), Max: 100.2 (2022 19:44)  HR: 85 (2022 08:29) (70 - 85)  BP: 123/77 (2022 08:29) (96/58 - 127/71)  BP(mean): --  RR: 18 (2022 08:29) (16 - 18)  SpO2: 98% (2022 08:29) (92% - 98%)    PHYSICAL EXAM:    GENERAL: Middle age female looking comfortable   HEENT: PERRL, +EOMI  NECK: soft, Supple, No JVD,   CHEST/LUNG: Clear to auscultate bilaterally; No wheezing  HEART: S1S2+, Regular rate and rhythm; No murmurs  ABDOMEN: Soft, Nontender, Nondistended; Bowel sounds present  EXTREMITIES:  1+ Peripheral Pulses, No edema  SKIN: No rashes or lesions  NEURO: AAOX3, no focal deficits, no motor r sensory loss  PSYCH: normal mood      LABS:                        9.5    4.66  )-----------( 201      ( 2022 03:02 )             29.2     06-05    134<L>  |  94<L>  |  30.5<H>  ----------------------------<  117<H>  3.9   |  27.0  |  1.36<H>    Ca    9.0      2022 03:02    TPro  6.6  /  Alb  4.0  /  TBili  0.6  /  DBili  x   /  AST  1189<H>  /  ALT  1297<H>  /  AlkPhos  198<H>  06-05    PT/INR - ( 2022 16:43 )   PT: 12.8 sec;   INR: 1.10 ratio           Urinalysis Basic - ( 2022 21:28 )    Color: Yellow / Appearance: Clear / S.020 / pH: x  Gluc: x / Ketone: Trace  / Bili: Negative / Urobili: 1 mg/dL   Blood: x / Protein: Negative / Nitrite: Negative   Leuk Esterase: Negative / RBC: 0-2 /HPF / WBC 0-2 /HPF   Sq Epi: x / Non Sq Epi: Occasional / Bacteria: Occasional          I&O's Summary      MEDICATIONS  (STANDING):  gabapentin 400 milliGRAM(s) Oral two times a day  heparin   Injectable 5000 Unit(s) SubCutaneous every 12 hours  loratadine 10 milliGRAM(s) Oral daily  sodium chloride 0.9%. 1000 milliLiter(s) (100 mL/Hr) IV Continuous <Continuous>    MEDICATIONS  (PRN):  cyclobenzaprine 5 milliGRAM(s) Oral every 12 hours PRN Muscle Spasm        
[address]    [id number]    52y      Female    Patient is a 52y old  Female who presents with a chief complaint of Elevated trop (2022 16:03)      INTERVAL HPI/OVERNIGHT EVENTS:    patient is feeling much better, denies any chest pain, sob, dizziness, fever, chills.     REVIEW OF SYSTEMS:    CONSTITUTIONAL: No fever, fatigue  RESPIRATORY: No cough, No shortness of breath  CARDIOVASCULAR: No chest pain, palpitations  GASTROINTESTINAL: No abdominal, No nausea, vomiting  NEUROLOGICAL: No headaches,  loss of strength.  MISCELLANEOUS: No joint swelling or pain       Vital Signs Last 24 Hrs  T(C): 36.7 (2022 04:47), Max: 36.8 (2022 15:39)  T(F): 98.1 (2022 04:47), Max: 98.3 (2022 21:30)  HR: 61 (2022 04:47) (61 - 98)  BP: 123/74 (2022 04:47) (123/74 - 161/92)  RR: 16 (2022 04:47) (16 - 18)  SpO2: 94% (2022 04:47) (94% - 98%)    PHYSICAL EXAM:    GENERAL: Middle age female looking comfortable   HEENT: PERRL, +EOMI  NECK: soft, Supple, No JVD,   CHEST/LUNG: Clear to auscultate bilaterally; No wheezing  HEART: S1S2+, Regular rate and rhythm; No murmurs  ABDOMEN: Soft, Nontender, Nondistended; Bowel sounds present  EXTREMITIES:  1+ Peripheral Pulses, No edema  SKIN: No rashes or lesions  NEURO: AAOX3, no focal deficits, no motor r sensory loss  PSYCH: normal mood      LABS:                        9.5    3.08  )-----------( 176      ( 2022 06:56 )             30.7     06-    142  |  101  |  12.5  ----------------------------<  120<H>  4.2   |  31.0<H>  |  0.73    Ca    8.7      2022 06:56    TPro  6.0<L>  /  Alb  3.6  /  TBili  0.4  /  DBili  x   /  AST  380<H>  /  ALT  835<H>  /  AlkPhos  181<H>      PT/INR - ( 2022 16:43 )   PT: 12.8 sec;   INR: 1.10 ratio           Urinalysis Basic - ( 2022 21:28 )    Color: Yellow / Appearance: Clear / S.020 / pH: x  Gluc: x / Ketone: Trace  / Bili: Negative / Urobili: 1 mg/dL   Blood: x / Protein: Negative / Nitrite: Negative   Leuk Esterase: Negative / RBC: 0-2 /HPF / WBC 0-2 /HPF   Sq Epi: x / Non Sq Epi: Occasional / Bacteria: Occasional          I&O's Summary    2022 07:  -  2022 07:00  --------------------------------------------------------  IN: 1440 mL / OUT: 300 mL / NET: 1140 mL    2022 07:01  -  2022 09:23  --------------------------------------------------------  IN: 236 mL / OUT: 0 mL / NET: 236 mL        MEDICATIONS  (STANDING):  gabapentin 400 milliGRAM(s) Oral two times a day  heparin   Injectable 5000 Unit(s) SubCutaneous every 12 hours  loratadine 10 milliGRAM(s) Oral daily  sodium chloride 0.9%. 1000 milliLiter(s) (100 mL/Hr) IV Continuous <Continuous>    MEDICATIONS  (PRN):  cyclobenzaprine 5 milliGRAM(s) Oral every 12 hours PRN Muscle Spasm        
                                                         [facility]                                                         CARDIOLOGY AT [facility]                                                                  [address] [facility], [id number] [place] [address]                                                         Telephone: [phone]. Fax:[phone]                                                                             PROGRESS NOTE    Reason for follow up: elevated troponin and syncope  Update: "i am ok"  no new symptoms.       Review of symptoms:   Cardiac:  No chest pain. No dyspnea. No palpitations.  Respiratory: no cough. No dyspnea  Gastrointestinal: No diarrhea. No abdominal pain. No bleeding.   Neuro: No focal neuro complaints.    Vitals:   Vital Signs Last 24 Hrs  T(C): 36.9 (06-08-22 @ 16:39), Max: 37 (06-08-22 @ 04:29)  T(F): 98.5 (06-08-22 @ 16:39), Max: 98.6 (06-08-22 @ 04:29)  HR: 86 (06-08-22 @ 16:39) (63 - 86)  BP: 151/96 (06-08-22 @ 16:39) (151/96 - 168/98)  BP(mean): --  RR: 18 (06-08-22 @ 16:39) (18 - 18)  SpO2: 95% (06-08-22 @ 16:39) (95% - 97%)    Weight (kg): 93 (06-04 @ 11:08)    PHYSICAL EXAM:  Appearance: Comfortable. No acute distress  HEENT:  Atraumatic. Normocephalic.  Normal oral mucosa  Neurologic: A & O x 3, no gross focal deficits.  Cardiovascular: RRR S1 S2, No murmur, no rubs/gallops. No JVD  Respiratory: Lungs clear to auscultation, unlabored   Gastrointestinal:  Soft, Non-tender, + BS  Lower Extremities: 2+ Peripheral Pulses, No clubbing, cyanosis, or edema  Psychiatry: Patient is calm. No agitation.   Skin: warm and dry.    CURRENT CARDIAC MEDICATIONS:  amLODIPine   Tablet 10 milliGRAM(s) Oral daily      CUR   MEDICATIONS  (STANDING):  amLODIPine   Tablet 10 milliGRAM(s) Oral daily    loratadine  gabapentin  heparin   Injectable  venlafaxine XR.    PRN: cyclobenzaprine PRN      LABS:	 	  ( 04 Jun 2022 21:41 )  Troponin T  0.09<H>,  CPK  X    , CKMB  X    , BNP X        , ( 04 Jun 2022 17:37 )  Troponin T  0.13<H>,  CPK  X    , CKMB  X    , BNP X              07 Jun 2022 13:36    140    |  100    |  5.6    ----------------------------<  127    3.7     |  29.0   |  0.58     Ca    8.9        07 Jun 2022 13:36    TPro  6.2    /  Alb  3.8    /  TBili  0.3    /  DBili  x      /  AST  177    /  ALT  572    /  AlkPhos  164    07 Jun 2022 13:36      Hepatic panel: 07 Jun 2022 13:36  6.2   | 3.8                            0.3   | 0.3  /x                              177   | 572                               /164  \par                                 Lipid Profile:   HgA1c:   TSH: Thyroid Stimulating Hormone, Serum: 0.49 uIU/mL  Thyroid Stimulating Hormone, Serum: 0.41 uIU/mL      TELEMETRY: NSR no events on tele  ECG:    DIAGNOSTIC TESTING:  [ ] Echocardiogram: < from: TTE Echo Complete w/ Contrast w/ Doppler (06.05.22 @ 08:59) >  PHYSICIAN INTERPRETATION:  Left Ventricle: The left ventricular internal cavity size is normal.  Global LVsystolic function was normal. Left ventricular ejection   fraction, by visual estimation, is 70 to 75%. Spectral Doppler shows   impaired relaxation pattern of left ventricular myocardial filling (Grade   I diastolic dysfunction).  Right Ventricle: Normal right ventricular size and function.  Left Atrium: Normal left atrial size.  Right Atrium: The right atrium is normal in size.  Pericardium: There is no evidence of pericardial effusion.  Mitral Valve: The mitral valve is normal in structure. Mitral leaflet   mobility is normal. Trace mitral valve regurgitation is seen.  Tricuspid Valve: Mild tricuspid regurgitation is visualized.  Aortic Valve: The aortic valve is trileaflet. Sclerotic aortic valve with   normal opening. Trivial aortic valve regurgitation is seen.  Pulmonic Valve: Trace pulmonic valve regurgitation.  Aorta: The aortic root is normal in size and structure.  Pulmonary Artery: The main pulmonary artery is normal in size. Normal   pulmonary artery pressure.  Venous: The inferior vena cava was normal sized, with respiratory size   variation greater than 50%.      Summary:   1. Normal left atrial size.   2. Normal wall motion. Left ventricular ejection fraction, by visual   estimation, is 70 to 75%. Grade I diastolic dysfunction.   3. The right atrium is normal in size.   4. Normal right ventricular size and function.   5. Mild tricuspid regurgitation.   6. There is no evidence of pericardial effusion.    MD Magdiel, RPVI Electronically signed on 6/5/2022 at 2:22:53 PM    < end of copied text >    [ ]  Catheterization:  [ ] Stress Test:    OTHER: 	      
                                                         [facility]                                                         CARDIOLOGY AT [facility]                                                                  [address] [facility], [place] [address]                                                         Telephone: [phone]. Fax:[phone]                                                                             PROGRESS NOTE    Reason for follow up: elevated troponin and syncope  Update: " I feel good" , pending NST read       Review of symptoms:   Cardiac:  No chest pain. No dyspnea. No palpitations.  Respiratory: no cough. No dyspnea  Gastrointestinal: No diarrhea. No abdominal pain. No bleeding.   Neuro: No focal neuro complaints.    Vitals:  T(C): 36.8 (06-07-22 @ 04:40), Max: 36.9 (06-06-22 @ 19:45)  HR: 69 (06-07-22 @ 04:40) (69 - 73)  BP: 137/78 (06-07-22 @ 04:40) (137/78 - 155/89)  RR: 18 (06-07-22 @ 04:40) (17 - 18)  SpO2: 97% (06-07-22 @ 04:40) (97% - 97%)  Wt(kg): --  I&O's Summary    06 Jun 2022 07:01  -  07 Jun 2022 07:00  --------------------------------------------------------  IN: 734 mL / OUT: 800 mL / NET: -66 mL      Weight (kg): 93 (06-04 @ 11:08)    PHYSICAL EXAM:  Appearance: Comfortable. No acute distress  HEENT:  Atraumatic. Normocephalic.  Normal oral mucosa  Neurologic: A & O x 3, no gross focal deficits.  Cardiovascular: RRR S1 S2, No murmur, no rubs/gallops. No JVD  Respiratory: Lungs clear to auscultation, unlabored   Gastrointestinal:  Soft, Non-tender, + BS  Lower Extremities: 2+ Peripheral Pulses, No clubbing, cyanosis, or edema  Psychiatry: Patient is calm. No agitation.   Skin: warm and dry.    CURRENT CARDIAC MEDICATIONS:  amLODIPine   Tablet 10 milliGRAM(s) Oral daily      CURRENT OTHER MEDICATIONS:  loratadine 10 milliGRAM(s) Oral daily  cyclobenzaprine 5 milliGRAM(s) Oral every 12 hours PRN Muscle Spasm  gabapentin 400 milliGRAM(s) Oral two times a day  venlafaxine XR. 150 milliGRAM(s) Oral daily  heparin   Injectable 5000 Unit(s) SubCutaneous every 12 hours      LABS:	 	  ( 04 Jun 2022 21:41 )  Troponin T  0.09<H>,  CPK  X    , CKMB  X    , BNP X        , ( 04 Jun 2022 17:37 )  Troponin T  0.13<H>,  CPK  X    , CKMB  X    , BNP X        , ( 04 Jun 2022 11:38 )  Troponin T  0.19<H>,  CPK  375<H>, CKMB  X    , BNP 2396<H>                              9.5    3.08  )-----------( 176      ( 06 Jun 2022 06:56 )             30.7     06-07    140  |  100  |  5.6<L>  ----------------------------<  127<H>  3.7   |  29.0  |  0.58    Ca    8.9      07 Jun 2022 13:36    TPro  6.2<L>  /  Alb  3.8  /  TBili  0.3<L>  /  DBili  x   /  AST  177<H>  /  ALT  572<H>  /  AlkPhos  164<H>  06-07    PT/INR/PTT ( 04 Jun 2022 16:43 )                       :                       :      12.8         :       X                     .        .                   .              .           .       1.10        .                                       Lipid Profile:   HgA1c:   TSH: Thyroid Stimulating Hormone, Serum: 0.49 uIU/mL  Thyroid Stimulating Hormone, Serum: 0.41 uIU/mL      TELEMETRY: NSR no events on tele  ECG:    DIAGNOSTIC TESTING:  [ ] Echocardiogram: < from: TTE Echo Complete w/ Contrast w/ Doppler (06.05.22 @ 08:59) >  PHYSICIAN INTERPRETATION:  Left Ventricle: The left ventricular internal cavity size is normal.  Global LVsystolic function was normal. Left ventricular ejection   fraction, by visual estimation, is 70 to 75%. Spectral Doppler shows   impaired relaxation pattern of left ventricular myocardial filling (Grade   I diastolic dysfunction).  Right Ventricle: Normal right ventricular size and function.  Left Atrium: Normal left atrial size.  Right Atrium: The right atrium is normal in size.  Pericardium: There is no evidence of pericardial effusion.  Mitral Valve: The mitral valve is normal in structure. Mitral leaflet   mobility is normal. Trace mitral valve regurgitation is seen.  Tricuspid Valve: Mild tricuspid regurgitation is visualized.  Aortic Valve: The aortic valve is trileaflet. Sclerotic aortic valve with   normal opening. Trivial aortic valve regurgitation is seen.  Pulmonic Valve: Trace pulmonic valve regurgitation.  Aorta: The aortic root is normal in size and structure.  Pulmonary Artery: The main pulmonary artery is normal in size. Normal   pulmonary artery pressure.  Venous: The inferior vena cava was normal sized, with respiratory size   variation greater than 50%.      Summary:   1. Normal left atrial size.   2. Normal wall motion. Left ventricular ejection fraction, by visual   estimation, is 70 to 75%. Grade I diastolic dysfunction.   3. The right atrium is normal in size.   4. Normal right ventricular size and function.   5. Mild tricuspid regurgitation.   6. There is no evidence of pericardial effusion.    MD Magdiel, RPVI Electronically signed on 6/5/2022 at 2:22:53 PM    < end of copied text >    [ ]  Catheterization:  [ ] Stress Test:    OTHER: 	      
                                                         [facility]                                                         CARDIOLOGY AT [facility]                                                                  [address] [facility] [id number] [place] [address]                                                         Telephone: [phone]. Fax:[phone]                                                                             PROGRESS NOTE    Reason for follow up: syncope  Update: tele- no events. troponin down trending  LFTs up trending, EKG unchanged. TTE 70-75%, no Pericardial Effusion.       Review of symptoms:   Cardiac:  No chest pain. No dyspnea. No palpitations.  Respiratory: no cough. No dyspnea  Gastrointestinal: No diarrhea. No abdominal pain. No bleeding.   Neuro: No focal neuro complaints.        Vitals:  T(C): 37 (06-05-22 @ 08:29), Max: 37.9 (06-04-22 @ 19:44)  HR: 85 (06-05-22 @ 08:29) (70 - 85)  BP: 123/77 (06-05-22 @ 08:29) (96/58 - 123/77)  RR: 18 (06-05-22 @ 08:29) (16 - 18)  SpO2: 98% (06-05-22 @ 08:29) (92% - 98%)      Weight (kg): 93 (06-04 @ 11:08)        PHYSICAL EXAM:  Appearance: Comfortable. No acute distress/ 1:1  HEENT:  Atraumatic. Normocephalic.  Normal oral mucosa  Neurologic: A & O x 3, no gross focal deficits.  Cardiovascular: RRR S1 S2, No murmur, no rubs/gallops. No JVD  Respiratory: Lungs clear to auscultation, unlabored   Gastrointestinal:  Soft, Non-tender, + BS  Lower Extremities: No edema  Psychiatry: Patient is calm. No agitation.   Skin: warm and dry.        CURRENT CARDIAC MEDICATIONS:        CURRENT OTHER MEDICATIONS:  loratadine 10 milliGRAM(s) Oral daily  cyclobenzaprine 5 milliGRAM(s) Oral every 12 hours PRN Muscle Spasm  gabapentin 400 milliGRAM(s) Oral two times a day  heparin   Injectable 5000 Unit(s) SubCutaneous every 12 hours  sodium chloride 0.9%. 1000 milliLiter(s) (100 mL/Hr) IV Continuous <Continuous>        LABS:	 	  ( 04 Jun 2022 21:41 )  Troponin T  0.09<H>,  CPK  X    , CKMB  X    , BNP X      ( 04 Jun 2022 17:37 )  Troponin T  0.13<H>,  CPK  X    , CKMB  X    , BNP X      ( 04 Jun 2022 11:38 )  Troponin T  0.19<H>,  CPK  375<H>, CKMB  X    , BNP 2396<H>                          9.5    4.66  )-----------( 201      ( 05 Jun 2022 03:02 )             29.2     06-05    134<L>  |  94<L>  |  30.5<H>  ----------------------------<  117<H>  3.9   |  27.0  |  1.36<H>    Ca    9.0      05 Jun 2022 03:02    TPro  6.6  /  Alb  4.0  /  TBili  0.6  /  DBili  x   /  AST  1189<H>  /  ALT  1297<H>  /  AlkPhos  198<H>  06-05    PT/INR/PTT ( 04 Jun 2022 16:43 )                         :                       :      12.8         :       X                     .        .                   .              .           .       1.10        .                                         TSH: Thyroid Stimulating Hormone, Serum: 0.49 uIU/mL  Thyroid Stimulating Hormone, Serum: 0.41 uIU/mL      TELEMETRY: SR        DIAGNOSTIC TESTING:  [ ] Echocardiogram:   < from: TTE Echo Complete w/ Contrast w/ Doppler (06.05.22 @ 08:59) >  Summary:   1. Normal left atrial size.   2. Normal wall motion. Left ventricular ejection fraction, by visual   estimation, is 70 to 75%. Grade I diastolic dysfunction.   3. The right atrium is normal in size.   4. Normal right ventricular size and function.   5. Mild tricuspid regurgitation.   6. There is no evidence of pericardial effusion.    MD Magdiel, RPVI Electronically signed on 6/5/2022 at 2:22:53 PM    < end of copied text >    [ ]  Catheterization:  [ ] Stress Test:    OTHER: 	      
                                                         [facility]                                                         CARDIOLOGY AT [facility] [place]                                                                  [address] [facility], [place] [address]                                                         Telephone: [phone]. Fax:[phone]                                                                             PROGRESS NOTE    Reason for follow up: elevated troponin , syncope  Update: denies chest pain, palpitations, shortness of breath, dizziness.   States feeling well. Labs improving. Plan for NST tomorrow, CT calcium score. NPO after MN       Review of symptoms:   Cardiac:  No chest pain. No dyspnea. No palpitations.  Respiratory: no cough. No dyspnea  Gastrointestinal: No diarrhea. No abdominal pain. No bleeding.   Neuro: No focal neuro complaints.      Vitals:  T(C): 36.7 (06-06-22 @ 04:47), Max: 36.8 (06-05-22 @ 15:39)  HR: 61 (06-06-22 @ 04:47) (61 - 98)  BP: 123/74 (06-06-22 @ 04:47) (123/74 - 161/92)  RR: 16 (06-06-22 @ 04:47) (16 - 18)  SpO2: 94% (06-06-22 @ 04:47) (94% - 98%)        I&O's Summary    05 Jun 2022 07:01  -  06 Jun 2022 07:00  --------------------------------------------------------  IN: 1440 mL / OUT: 300 mL / NET: 1140 mL        Weight (kg): 93 (06-04 @ 11:08)        PHYSICAL EXAM:  Appearance: Comfortable. No acute distress  HEENT:  Atraumatic. Normocephalic.  Normal oral mucosa  Neurologic: A & O x 3, no gross focal deficits.  Cardiovascular: RRR S1 S2, No murmur, no rubs/gallops. No JVD  Respiratory: Lungs clear to auscultation, unlabored   Gastrointestinal:  Soft, Non-tender, + BS  Lower Extremities: No edema  Psychiatry: Patient is calm. No agitation.   Skin: warm and dry.      CURRENT CARDIAC MEDICATIONS:        CURRENT OTHER MEDICATIONS:  loratadine 10 milliGRAM(s) Oral daily  cyclobenzaprine 5 milliGRAM(s) Oral every 12 hours PRN Muscle Spasm  gabapentin 400 milliGRAM(s) Oral two times a day  heparin   Injectable 5000 Unit(s) SubCutaneous every 12 hours  sodium chloride 0.9%. 1000 milliLiter(s) (100 mL/Hr) IV Continuous <Continuous>        LABS:	 	  ( 04 Jun 2022 21:41 )  Troponin T  0.09<H>,  CPK  X    , CKMB  X    , BNP X      , ( 04 Jun 2022 17:37 )  Troponin T  0.13<H>,  CPK  X    , CKMB  X    , BNP X      , ( 04 Jun 2022 11:38 )  Troponin T  0.19<H>,  CPK  375<H>, CKMB  X    , BNP 2396<H>                            9.5    3.08  )-----------( 176      ( 06 Jun 2022 06:56 )             30.7     06-06    142  |  101  |  12.5  ----------------------------<  120<H>  4.2   |  31.0<H>  |  0.73    Ca    8.7      06 Jun 2022 06:56    TPro  6.0<L>  /  Alb  3.6  /  TBili  0.4  /  DBili  x   /  AST  380<H>  /  ALT  835<H>  /  AlkPhos  181<H>  06-06    PT/INR/PTT ( 04 Jun 2022 16:43 )                       :                       :      12.8         :       X                     .        .                   .              .           .       1.10        .                                         TSH: Thyroid Stimulating Hormone, Serum: 0.49 uIU/mL  Thyroid Stimulating Hormone, Serum: 0.41 uIU/mL      TELEMETRY: SR, no events      DIAGNOSTIC TESTING:  [ ] Echocardiogram:   < from: TTE Echo Complete w/ Contrast w/ Doppler (06.05.22 @ 08:59) >    PHYSICIAN INTERPRETATION:  Left Ventricle: The left ventricular internal cavity size is normal.  Global LVsystolic function was normal. Left ventricular ejection   fraction, by visual estimation, is 70 to 75%. Spectral Doppler shows   impaired relaxation pattern of left ventricular myocardial filling (Grade   I diastolic dysfunction).  Right Ventricle: Normal right ventricular size and function.  Left Atrium: Normal left atrial size.  Right Atrium: The right atrium is normal in size.  Pericardium: There is no evidence of pericardial effusion.  Mitral Valve: The mitral valve is normal in structure. Mitral leaflet   mobility is normal. Trace mitral valve regurgitation is seen.  Tricuspid Valve: Mild tricuspid regurgitation is visualized.  Aortic Valve: The aortic valve is trileaflet. Sclerotic aortic valve with   normal opening. Trivial aortic valve regurgitation is seen.  Pulmonic Valve: Trace pulmonic valve regurgitation.  Aorta: The aortic root is normal in size and structure.  Pulmonary Artery: The main pulmonary artery is normal in size. Normal   pulmonary artery pressure.  Venous: The inferior vena cava was normal sized, with respiratory size   variation greater than 50%.      Summary:   1. Normal left atrial size.   2. Normal wall motion. Left ventricular ejection fraction, by visual   estimation, is 70 to 75%. Grade I diastolic dysfunction.   3. The right atrium is normal in size.   4. Normal right ventricular size and function.   5. Mild tricuspid regurgitation.   6. There is no evidence of pericardial effusion.    MD Magdiel, RPVI Electronically signed on 6/5/2022 at 2:22:53 PM    < end of copied text >

## 2022-06-08 NOTE — BH CONSULTATION LIAISON PROGRESS NOTE - NSBHASSESSMENTFT_PSY_ALL_CORE
51yo retired female  and domiciled at home with  and two kids with a hx of depression, ADHD, HTN, chronic back pain, and alcohol abuse, BIBEMS from [facility] (6/4) after being found unresponsive. Pt reports she has been abusing alcohol for the past 4 years stating intermittent drinking with beer progressing to vodka. At this time, pt remains very hopeful and motivated. Pt requesting to be transferred back to [facility] to continue psych treatment following suicide attempt for depression and anxiety.

## 2022-06-08 NOTE — PROGRESS NOTE ADULT - PROBLEM SELECTOR PROBLEM 1
Medication refill request has been sent to Dr. Ham for approval.  
Elevated troponin

## 2022-06-08 NOTE — PROGRESS NOTE ADULT - PROBLEM SELECTOR PROBLEM 2
Suicide by drug overdose
Suicide by drug overdose
Acute renal failure (ARF)
Acute renal failure (ARF)

## 2022-06-08 NOTE — DISCHARGE NOTE NURSING/CASE MANAGEMENT/SOCIAL WORK - NSDCPEFALRISK_GEN_ALL_CORE
For information on Fall & Injury Prevention, visit: [email or web address] OR  [email or web address] OR  https://www.cdc.gov/steadi/patient.html

## 2022-06-08 NOTE — DISCHARGE NOTE NURSING/CASE MANAGEMENT/SOCIAL WORK - PATIENT PORTAL LINK FT
You can access the FollowMyHealth Patient Portal offered by [facility] by registering at the following website: [email or web address]. By joining [employer]’s FollowMyHealth portal, you will also be able to view your health information using other applications (apps) compatible with our system.

## 2022-06-08 NOTE — BH CONSULTATION LIAISON PROGRESS NOTE - NSBHFUPINTERVALHXFT_PSY_A_CORE
51yo retired female  and domiciled at home with  and two kids with a hx of depression, ADHD, HTN, chronic back pain, and alcohol abuse, BIBEMS from [facility] (6/4) after being found unresponsive. Pt expresses anxiety today concerning transfer back to [facility] and re-adjusting with a new team there. Otherwise, feels "excited" to continue psych treatment and eventually inpatient rehab for alcohol abuse. Denies current SI/HI, A/V/H, agitation, CP, SOB, palpitations. Medically cleared for discharge, no other medical complaints.

## 2022-06-08 NOTE — PROGRESS NOTE ADULT - ASSESSMENT
52F who was previously admitted to [facility] after a suicide attempt who was recently started on buprenorphine/naloxone who was found to be unresponsive and hypoxic for which naloxone was administered. Laboratory studies later noted acute kidney injury with hyperkalemia, elevated troponin level, and transaminitis.    Plan:       Unresponsiveness / Hypoxia - Suspect secondary to medications (buprenorphine/naloxone, venlafaxine). Improved with naloxone administration. No neurological deficits noted on examination.     D-dimer noted to be slightly elevated. Pending ventilation/perfusion scan. does not require any supplemental O2    Acute kidney injury / Hyperkalemia / Hyponatremia - Hydrochlorothiazide held. Lokelma administered for hyperkalemia. EKG was without acute changes, potassium is normal range now, Renal ultrasound being done, creatinine is improving.     Transaminitis - Venlafaxine to be held for now. No abdominal pain or elevated bilirubin level, will monitor, likely from hypotension from Nalaxone, will get hepatitis panel and will monitor LFTs    Elevated troponin level - Noted in the setting of hypoxia and acute kidney injury. EKG was without acute findings. Monitor on telemetry. Repeat troponin level improving, Cardiology consultation appreciated.    Suicide attempt - On venlafaxine. Constant bedside observation, will send her back to [facility] once medically stable     Neuropathy - On gabapentin. Dose adjustment pending renal function.    Hypertension - Close blood pressure monitoring. On amlodipine, will resume if BP goes high
52F who was previously admitted to [facility] after a suicide attempt who was recently started on buprenorphine/naloxone who was found to be unresponsive and hypoxic for which naloxone was administered. Laboratory studies later noted acute kidney injury with hyperkalemia, elevated troponin level, and transaminitis.    Plan:       Unresponsiveness / Hypoxia - Suspect secondary to medications (buprenorphine/naloxone, venlafaxine). Improved with naloxone administration. No neurological deficits noted on examination. Patient does not require supplemental oxygen.     D-dimer noted to be slightly elevated, CT angio negative    Acute kidney injury / Hyperkalemia / Hyponatremia - Hydrochlorothiazide held. Lokelma administered for hyperkalemia. EKG was without acute changes, potassium is normal range now, Renal ultrasound no hydro. CR normalized    Transaminitis - LFT's downtrending. Hepatitis panel reviewed, Effexor resumed.    Elevated troponin level - Noted in the setting of hypoxia and acute kidney injury. EKG was without acute findings. Monitor on telemetry. Repeat troponin level improving, Cardiology consultation appreciated, g Cardiac CT calcium score reviewed and Nuclear stress test performed, results pending.     Suicide attempt - On venlafaxine. Constant bedside observation, will send her back to [facility] once medically stable, Psych consult called.    Neuropathy - On gabapentin.    Hypertension - Amlodipine resumed    Anemia: H&H stable, no active S/S bleeding. Outpatient follow up with PMD    DVT prophylaxis: heparin SC       Dispo: Pending NUKE results. Awaiting Psychiatry input.
52F who was previously admitted to [facility] after a suicide attempt who was recently started on buprenorphine/naloxone who was found to be unresponsive and hypoxic for which naloxone was administered. Laboratory studies later noted acute kidney injury with hyperkalemia, elevated troponin level, and transaminitis.    Plan:       Unresponsiveness / Hypoxia - Suspect secondary to medications (buprenorphine/naloxone, venlafaxine). Improved with naloxone administration. No neurological deficits noted on examination, she is saturating well on room air.      D-dimer noted to be slightly elevated, will get CT angio, does not require any supplemental O2    Acute kidney injury / Hyperkalemia / Hyponatremia - Hydrochlorothiazide held. Lokelma administered for hyperkalemia. EKG was without acute changes, potassium is normal range now, Renal ultrasound being done, creatinine is improved to normal    Transaminitis - Venlafaxine to be held for now. No abdominal pain or elevated bilirubin level, will monitor, hepatitis panel is pending, Liver enzymes are trending down, will monitor LFTs    Elevated troponin level - Noted in the setting of hypoxia and acute kidney injury. EKG was without acute findings. Monitor on telemetry. Repeat troponin level improving, Cardiology consultation appreciated, getting Cardiac CT to get calcium score and Nuclear stress test    Suicide attempt - On venlafaxine. Constant bedside observation, will send her back to [facility] once medically stable, Psych consult called as [facility] require psych eval before discharge from the hospital.     Neuropathy - On gabapentin.    Hypertension - Close blood pressure monitoring. On amlodipine, will resume if BP goes high    Anemia: Looks like chronic, H&H has been stable, might need further workup as out patient.     DVT prophylaxis: heparin SC       Dispo: Pending further workup. 
52y old  Female  with PMH of alcoholism, depression, HTN, Chronic back pain who presents to the ER from [facility] after being found hypoxic and non responsive.  She was doing well in [facility] and states she did not take anything other then meds prescribed.  Was started on buprenorphine/naloxone yesterday which made her sleepy.  She was in [facility] after a suicide attempt.  She states she no longer wants to die  Arrived in er and blood work c/w ARF. hyponatremia with transaminitis and with elevated top  We were asked to see her for positive trop  on exam she has a positive rub. 
52y old  Female  with PMH of alcoholism, depression, HTN, Chronic back pain who presents to the ER from [facility] after being found hypoxic and non responsive.  She was doing well in [facility] and states she did not take anything other then meds prescribed.  Was started on buprenorphine/naloxone yesterday which made her sleepy.  She was in [facility] after a suicide attempt.  She states she no longer wants to die. Arrived in er and blood work c/w ARF. hyponatremia with transaminitis and with elevated top  We were asked to see her for positive trop  on exam she has a positive rub. 
52y old  Female  with PMH of alcoholism, depression, HTN, Chronic back pain who presents to the ER from [facility] after being found hypoxic and non responsive.  She was doing well in [facility] and states she did not take anything other then meds prescribed.  Was started on buprenorphine/naloxone yesterday which made her sleepy.  She was in [facility] after a suicide attempt.  She states she no longer wants to die  Arrived in er and blood work c/w ARF. hyponatremia with transaminitis and with elevated top  We were asked to see her for positive trop  on exam she has a positive rub.     Problem/Plan - 1:  ·  Problem: Elevated troponin.     Per Cardiology,    Problem/Plan - 2:  ·  Problem: Acute renal failure (ARF).   ·  Plan: .  - IVF   - improving   - US - no hydronephrosis   
Shalom  Hyponatremia  Hyperkalemia    Pre renal Shalom  Scr improved with hydration  hyponatremia in setting of Shalom and HCTZ use  s/p Lokelma for hyperkalemia-  K+ improved   Renal US reviewed- normal study    nephrology signing off; please reconsult prn
52y old  Female  with PMH of alcoholism, depression, HTN, Chronic back pain who presents to the ER from [facility] after being found hypoxic and non responsive.  She was doing well in [facility] and states she did not take anything other then meds prescribed.  Was started on buprenorphine/naloxone yesterday which made her sleepy.  She was in [facility] after a suicide attempt.  She states she no longer wants to die. Arrived in er and blood work c/w ARF. hyponatremia with transaminitis and with elevated top  We were asked to see her for positive trop  on exam she has a positive rub. 
52y old  Female  with PMH of alcoholism, depression, HTN, Chronic back pain who presents to the ER from [facility] after being found hypoxic and non responsive.  She was doing well in [facility] and states she did not take anything other then meds prescribed.  Was started on buprenorphine/naloxone yesterday which made her sleepy.  She was in [facility] after a suicide attempt.  She states she no longer wants to die. Arrived in er and blood work c/w ARF. hyponatremia with transaminitis and with elevated top  We were asked to see her for positive trop  on exam she has a positive rub.

## 2022-06-08 NOTE — BH CONSULTATION LIAISON PROGRESS NOTE - NSBHCHARTREVIEWVS_PSY_A_CORE FT
Vital Signs Last 24 Hrs  T(C): 36.8 (08 Jun 2022 10:20), Max: 37 (08 Jun 2022 04:29)  T(F): 98.3 (08 Jun 2022 10:20), Max: 98.6 (08 Jun 2022 04:29)  HR: 70 (08 Jun 2022 10:20) (63 - 70)  BP: 155/99 (08 Jun 2022 10:20) (137/83 - 168/98)  BP(mean): --  RR: 18 (08 Jun 2022 10:20) (18 - 18)  SpO2: 96% (08 Jun 2022 10:20) (96% - 98%)

## 2022-06-08 NOTE — BH CONSULTATION LIAISON PROGRESS NOTE - NSBHCONSULTFOLLOWAFTERCARE_PSY_A_CORE FT
Inpatient psych admission at [facility] for treatment of depression s/p suicide attempt

## 2022-06-15 NOTE — CDI QUERY NOTE - NSCDIOTHERTXTBX_GEN_ALL_CORE_HH
Cardio team state Likely does not represent cardiac ischemic but discharge notes with NSTEMI, after study can you please clarify if condition was ruled in or out?  A.	NSTEMI ruled out  B.	NSTEMI ruled in, present on admission  C.	Other, please specify  D.	Not clinically significant      Chart documentation:    Troponin T, Serum:  Troponin T, Serum: 0.09: (06.04.22 @ 21:41)   Troponin T, Serum: 0.13 ng/mL (06.04.22 @ 17:37)   Troponin T, Serum: 0.19: (06.04.22 @ 11:38)       ED Provider Note [Charted Location: [facility]] [Authored: 04-Jun-2022 13:33]  Impression:  1.     Principal Discharge Dx NSTEMI (non-ST elevation myocardial infarction).         Consult Note Adult-Cardiology Nurse Practitioner/Attending [Charted Location: [facility] 1606 15] [Authored: 04-Jun-2022 16:03]  Problem/Recommendation - 1:  •? Problem: Elevated troponin.   •? Recommendation: in the setting of hypoxemia and renal failure  would trend trop  Likely does not represent cardiac ischemic but would repeat ekg in am and trend trop        Progress Note Adult-Cardiology Nurse Practitioner/Attending [Charted Location: [address] [address]204 01] [Authored: 06-Jun-2022 08:30]  Problem/Plan - 1:  •? Problem: Elevated troponin.   •? Plan: .  - likely not cardiac  - NST tomorrow, CT calcium score tomorrow  - NPO after MN  - TTE EF 75%, no pericardial effusion.        Discharge Note Provider [Charted Location: [address] [address] 01] [Authored: 07-Jun-2022 08:26]  PRINCIPAL DISCHARGE DIAGNOSIS  Diagnosis: NSTEMI (non-ST elevation myocardial infarction)  Assessment and Plan of Treatment

## 2022-07-14 ENCOUNTER — TRANSCRIPTION ENCOUNTER (OUTPATIENT)
Age: 52
End: 2022-07-14

## 2023-09-27 NOTE — BH CONSULTATION LIAISON ASSESSMENT NOTE - GENERAL APPEARANCE
How Severe Are They?: moderate
Is This A New Presentation, Or A Follow-Up?: Discoloration
Additional History: Patient here for CO 2 treatment\\n\\nPER DR MOORE CHARGE $1000.00
No deformities present